# Patient Record
Sex: FEMALE | Race: WHITE | NOT HISPANIC OR LATINO | Employment: FULL TIME | URBAN - NONMETROPOLITAN AREA
[De-identification: names, ages, dates, MRNs, and addresses within clinical notes are randomized per-mention and may not be internally consistent; named-entity substitution may affect disease eponyms.]

---

## 2019-04-18 ENCOUNTER — OFFICE VISIT (OUTPATIENT)
Dept: FAMILY MEDICINE CLINIC | Facility: CLINIC | Age: 24
End: 2019-04-18

## 2019-04-18 VITALS
DIASTOLIC BLOOD PRESSURE: 78 MMHG | OXYGEN SATURATION: 99 % | TEMPERATURE: 98.9 F | SYSTOLIC BLOOD PRESSURE: 124 MMHG | HEIGHT: 71 IN | BODY MASS INDEX: 24.92 KG/M2 | WEIGHT: 178 LBS | HEART RATE: 103 BPM

## 2019-04-18 DIAGNOSIS — Z30.09 FAMILY PLANNING: ICD-10-CM

## 2019-04-18 DIAGNOSIS — K21.9 GASTROESOPHAGEAL REFLUX DISEASE WITHOUT ESOPHAGITIS: ICD-10-CM

## 2019-04-18 DIAGNOSIS — Z00.00 PHYSICAL EXAM: ICD-10-CM

## 2019-04-18 DIAGNOSIS — Z76.89 ENCOUNTER TO ESTABLISH CARE: ICD-10-CM

## 2019-04-18 DIAGNOSIS — I73.00 RAYNAUD'S PHENOMENON WITHOUT GANGRENE: Primary | ICD-10-CM

## 2019-04-18 LAB
ALBUMIN SERPL-MCNC: 4 G/DL (ref 3.5–5.2)
ALBUMIN/GLOB SERPL: 1.1 G/DL
ALP SERPL-CCNC: 57 U/L (ref 39–117)
ALT SERPL W P-5'-P-CCNC: 15 U/L (ref 1–33)
ANION GAP SERPL CALCULATED.3IONS-SCNC: 13.2 MMOL/L
AST SERPL-CCNC: 15 U/L (ref 1–32)
BASOPHILS # BLD AUTO: 0.06 10*3/MM3 (ref 0–0.2)
BASOPHILS NFR BLD AUTO: 0.5 % (ref 0–1.5)
BILIRUB SERPL-MCNC: <0.2 MG/DL (ref 0.2–1.2)
BUN BLD-MCNC: 8 MG/DL (ref 6–20)
BUN/CREAT SERPL: 8.9 (ref 7–25)
CALCIUM SPEC-SCNC: 10 MG/DL (ref 8.6–10.5)
CHLORIDE SERPL-SCNC: 103 MMOL/L (ref 98–107)
CO2 SERPL-SCNC: 24.8 MMOL/L (ref 22–29)
CREAT BLD-MCNC: 0.9 MG/DL (ref 0.57–1)
DEPRECATED RDW RBC AUTO: 40.8 FL (ref 37–54)
EOSINOPHIL # BLD AUTO: 0.81 10*3/MM3 (ref 0–0.4)
EOSINOPHIL NFR BLD AUTO: 6.9 % (ref 0.3–6.2)
ERYTHROCYTE [DISTWIDTH] IN BLOOD BY AUTOMATED COUNT: 12.7 % (ref 12.3–15.4)
GFR SERPL CREATININE-BSD FRML MDRD: 78 ML/MIN/1.73
GLOBULIN UR ELPH-MCNC: 3.7 GM/DL
GLUCOSE BLD-MCNC: 99 MG/DL (ref 65–99)
HCT VFR BLD AUTO: 41.2 % (ref 34–46.6)
HGB BLD-MCNC: 13.6 G/DL (ref 12–15.9)
IMM GRANULOCYTES # BLD AUTO: 0.03 10*3/MM3 (ref 0–0.05)
IMM GRANULOCYTES NFR BLD AUTO: 0.3 % (ref 0–0.5)
LYMPHOCYTES # BLD AUTO: 3.09 10*3/MM3 (ref 0.7–3.1)
LYMPHOCYTES NFR BLD AUTO: 26.4 % (ref 19.6–45.3)
MCH RBC QN AUTO: 29.1 PG (ref 26.6–33)
MCHC RBC AUTO-ENTMCNC: 33 G/DL (ref 31.5–35.7)
MCV RBC AUTO: 88 FL (ref 79–97)
MONOCYTES # BLD AUTO: 0.75 10*3/MM3 (ref 0.1–0.9)
MONOCYTES NFR BLD AUTO: 6.4 % (ref 5–12)
NEUTROPHILS # BLD AUTO: 6.98 10*3/MM3 (ref 1.4–7)
NEUTROPHILS NFR BLD AUTO: 59.5 % (ref 42.7–76)
NRBC BLD AUTO-RTO: 0 /100 WBC (ref 0–0)
PLATELET # BLD AUTO: 269 10*3/MM3 (ref 140–450)
PMV BLD AUTO: 13.7 FL (ref 6–12)
POTASSIUM BLD-SCNC: 4.3 MMOL/L (ref 3.5–5.2)
PROT SERPL-MCNC: 7.7 G/DL (ref 6–8.5)
RBC # BLD AUTO: 4.68 10*6/MM3 (ref 3.77–5.28)
SODIUM BLD-SCNC: 141 MMOL/L (ref 136–145)
WBC NRBC COR # BLD: 11.72 10*3/MM3 (ref 3.4–10.8)

## 2019-04-18 PROCEDURE — 80053 COMPREHEN METABOLIC PANEL: CPT | Performed by: FAMILY MEDICINE

## 2019-04-18 PROCEDURE — 99385 PREV VISIT NEW AGE 18-39: CPT | Performed by: FAMILY MEDICINE

## 2019-04-18 PROCEDURE — 85025 COMPLETE CBC W/AUTO DIFF WBC: CPT | Performed by: FAMILY MEDICINE

## 2019-04-18 RX ORDER — AMLODIPINE BESYLATE 5 MG/1
2.5 TABLET ORAL DAILY
Qty: 90 TABLET | Refills: 1 | Status: SHIPPED | OUTPATIENT
Start: 2019-04-18 | End: 2019-06-05 | Stop reason: SDUPTHER

## 2019-04-18 RX ORDER — NORETHINDRONE ACETATE AND ETHINYL ESTRADIOL 1; .02 MG/1; MG/1
TABLET ORAL
COMMUNITY
End: 2019-04-18 | Stop reason: SINTOL

## 2019-04-18 RX ORDER — AMLODIPINE BESYLATE 5 MG/1
2.5 TABLET ORAL DAILY
COMMUNITY
End: 2019-04-18 | Stop reason: SDUPTHER

## 2019-04-18 RX ORDER — OMEPRAZOLE 20 MG/1
20 CAPSULE, DELAYED RELEASE ORAL DAILY
COMMUNITY
End: 2019-05-17

## 2019-04-18 NOTE — PROGRESS NOTES
Subjective   Sergo Solis is a 23 y.o. female.   Pt presents today with CC of Establish [facility] [place] and Headache      History of Present Illness   1.  Patient is here to [facility].  #2 she has a past medical history of migraine with aura.  She takes oral contraceptive pill containing estrogen.  She is always tolerated this well.  Knowing the risk involved with migraine with aura, she would like to discuss other forms of contraception.  #3 she complains of acute worsening of her headache frequency.  She would like to discuss nonpharmacologic options for migraine treatment.  Her migraines last 12 hours typically, are usually behind one eye or the other, severity 7 out of 10 and associated with phono phobia and photophobia.  Her migraines are not debilitating though make it difficult to work.  She denies any other associated symptoms.  She is having 3/week currently.  She reports associated neck stiffness.  #4 she has a history of raynaud's phenomenon.  She was started on Norvasc, this is helped some..  Her symptoms are worse in the wintertime.  She typically will take 2.5 mg daily in the summer, 5 mg daily in the winter.  #5 she has GERD.  She takes omeprazole 20 mg as needed.  When her symptoms flareup she will take it for a few days then stop.  She reports that she takes this medicine relatively rarely.         The following portions of the patient's history were reviewed and updated as appropriate: allergies, current medications, past family history, past social history, past surgical history and problem list.    Review of Systems   Constitutional: Negative for chills, fever and unexpected weight loss.   HENT: Negative for congestion and sore throat.    Eyes: Negative for blurred vision and visual disturbance.   Respiratory: Negative for cough and wheezing.    Cardiovascular: Negative for chest pain and palpitations.   Gastrointestinal: Negative for abdominal pain and diarrhea.   Genitourinary: Negative for  dysuria.   Musculoskeletal: Negative for arthralgias and neck stiffness.   Neurological: Negative for dizziness, seizures and syncope.   Psychiatric/Behavioral: Negative for self-injury, suicidal ideas and depressed mood.       Objective   Physical Exam   Constitutional: She is oriented to person, place, and time. She appears well-developed and well-nourished.   HENT:   Head: Normocephalic and atraumatic.   Right Ear: External ear normal.   Left Ear: External ear normal.   Nose: Nose normal.   Mouth/Throat: Oropharynx is clear and moist.   Eyes: Conjunctivae and EOM are normal. Pupils are equal, round, and reactive to light.   Neck: Normal range of motion. Neck supple.   Cardiovascular: Normal rate, regular rhythm and normal heart sounds.   Pulmonary/Chest: Effort normal and breath sounds normal.   Abdominal: Soft. Bowel sounds are normal.   Musculoskeletal:   Fingertips cool to touch, otherwise neurovascularly intact.   Neurological: She is alert and oriented to person, place, and time.   Skin: Skin is warm and dry.   Psychiatric: She has a normal mood and affect. Her behavior is normal.         Assessment/Plan   Sergo was seen today for establish care and headache.    Diagnoses and all orders for this visit:    Raynaud's phenomenon without gangrene  -     CBC & Differential  -     Comprehensive Metabolic Panel  -     amLODIPine (NORVASC) 5 MG tablet; Take 0.5 tablets by mouth Daily.  -     CBC Auto Differential  -     Manual Differential; Future  -     Cancel: Manual Differential  As she has no concerns with it and reports that it works, we will continue it at her current dose.  Side effects were reviewed and she was agreeable to take it as prescribed.  Encounter to establish care  -     CBC & Differential  -     Comprehensive Metabolic Panel  -     CBC Auto Differential  -     Manual Differential; Future  -     Cancel: Manual Differential    Gastroesophageal reflux disease without esophagitis  -     CBC &  Differential  -     Comprehensive Metabolic Panel  -     CBC Auto Differential  -     Manual Differential; Future  -     Cancel: Manual Differential    Physical exam  -     CBC & Differential  -     Comprehensive Metabolic Panel  -     CBC Auto Differential  -     Manual Differential; Future  -     Cancel: Manual Differential  No concerns on exam today.  No gynecologic exam done as she has this done elsewhere.  Age-specific counseling given including discussions of nutrition, family planning/contraception, physical activity, healthy weight, injury prevention,  mental health, immunizations, and disease screening.  Patient has no concerns.  Family-planning  I recommended she stop the estrogen containing oral contraceptive at the end of her next pack.  We discussed options and she would like Nexplanon.  Nexplanon ordered, she understands that it may take a few weeks for her insurance to approve it in for her to arrive to the clinic.  She is agreeable.  She is to use condoms for pregnancy prevention until further notice.

## 2019-04-19 ENCOUNTER — TELEPHONE (OUTPATIENT)
Dept: FAMILY MEDICINE CLINIC | Facility: CLINIC | Age: 24
End: 2019-04-19

## 2019-04-19 NOTE — TELEPHONE ENCOUNTER
----- Message from Vijay Rodríguez DO sent at 4/19/2019  8:38 AM EDT -----  Please send letter.  With the exception of a white blood cell count that was slightly elevated (11.72), there were no concerns on labs.  Please let us know if you have any questions.      Letter mailed.

## 2019-04-21 PROBLEM — Z30.09 FAMILY PLANNING: Status: ACTIVE | Noted: 2019-04-21

## 2019-05-08 ENCOUNTER — TELEPHONE (OUTPATIENT)
Dept: FAMILY MEDICINE CLINIC | Facility: CLINIC | Age: 24
End: 2019-05-08

## 2019-05-17 ENCOUNTER — OFFICE VISIT (OUTPATIENT)
Dept: FAMILY MEDICINE CLINIC | Facility: CLINIC | Age: 24
End: 2019-05-17

## 2019-05-17 VITALS
DIASTOLIC BLOOD PRESSURE: 74 MMHG | HEIGHT: 71 IN | WEIGHT: 177.8 LBS | BODY MASS INDEX: 24.89 KG/M2 | SYSTOLIC BLOOD PRESSURE: 118 MMHG | TEMPERATURE: 99 F | OXYGEN SATURATION: 100 % | HEART RATE: 83 BPM

## 2019-05-17 DIAGNOSIS — Z30.09 FAMILY PLANNING: Primary | ICD-10-CM

## 2019-05-17 LAB
B-HCG UR QL: NEGATIVE
INTERNAL NEGATIVE CONTROL: NEGATIVE
INTERNAL POSITIVE CONTROL: POSITIVE
Lab: NORMAL

## 2019-05-17 PROCEDURE — 81025 URINE PREGNANCY TEST: CPT | Performed by: FAMILY MEDICINE

## 2019-05-17 PROCEDURE — 11981 INSERTION DRUG DLVR IMPLANT: CPT | Performed by: FAMILY MEDICINE

## 2019-05-17 NOTE — PROGRESS NOTES
Subjective   Sergo Solis is a 24 y.o. female.   Pt presents today with CC of Procedure      History of Present Illness   Patient is here to follow-up for Nexplanon placement.  She denies fevers or chills.  She is currently on her menses.  She is right-handed, she would like the implant in her left arm         The following portions of the patient's history were reviewed and updated as appropriate: allergies, current medications, past family history, past social history, past surgical history and problem list.    Review of Systems   Constitutional: Negative for chills, fatigue and fever.   HENT: Negative for congestion, postnasal drip and rhinorrhea.    Eyes: Negative for blurred vision, double vision and itching.   Respiratory: Negative for cough and shortness of breath.    Cardiovascular: Negative for chest pain.   Musculoskeletal: Negative for back pain, joint swelling and myalgias.   Neurological: Negative for dizziness and light-headedness.       Objective   Physical Exam   Constitutional: She is oriented to person, place, and time. She appears well-developed and well-nourished.   HENT:   Head: Normocephalic and atraumatic.   Mouth/Throat: Oropharynx is clear and moist.   Eyes: Conjunctivae are normal. No scleral icterus.   Neck: No thyromegaly present.   Cardiovascular: Normal rate and regular rhythm.   Pulmonary/Chest: Effort normal and breath sounds normal.   Abdominal: Soft.   Lymphadenopathy:     She has no cervical adenopathy.   Neurological: She is alert and oriented to person, place, and time.   Nursing note and vitals reviewed.    Procedures     Nexplanon placement: Urine hCG was negative.  Patient prefers her left arm.  Patient laid supine with her left arm flexed at the elbow and externally rotated.  The insertion site was marked at 9 cm from her medial epicondyle, the site was cleaned with alcohol and iodine and allowed to dry.  Insertion site and track for the Nexplanon insertion was  anesthetized with 1% lidocaine.  Nexplanon insertion device placed at 30 degrees from her skin and inserted, it was tracked underneath her skin to full insertion, the device was deployed.  The insertion site was sealed and bandaged.  Was wrapped with compressive bandage to avoid bruising.  She was instructed to leave the bandage on for 2 days.  No baths, she may shower if she avoids getting it wet.  She was instructed on signs of infection if she has any problems she will return to clinic.  She tolerated the procedure well without any complications before leaving.  Device expiration date October 11, 2021, lot number [id number].  Assessment/Plan   Sergo was seen today for procedure.    Diagnoses and all orders for this visit:    Family planning  -     POCT pregnancy, urine                 Patient's Body mass index is 24.81 kg/m². BMI is above normal parameters. Recommendations include: nutrition counseling.

## 2019-05-20 PROBLEM — Z76.89 ENCOUNTER TO ESTABLISH CARE: Status: RESOLVED | Noted: 2019-04-18 | Resolved: 2019-05-20

## 2019-06-05 DIAGNOSIS — I73.00 RAYNAUD'S PHENOMENON WITHOUT GANGRENE: ICD-10-CM

## 2019-06-05 RX ORDER — AMLODIPINE BESYLATE 2.5 MG/1
2.5 TABLET ORAL DAILY
Qty: 90 TABLET | Refills: 0 | Status: SHIPPED | OUTPATIENT
Start: 2019-06-05 | End: 2019-09-20 | Stop reason: SDUPTHER

## 2019-09-11 ENCOUNTER — TELEPHONE (OUTPATIENT)
Dept: FAMILY MEDICINE CLINIC | Facility: CLINIC | Age: 24
End: 2019-09-11

## 2019-09-11 NOTE — TELEPHONE ENCOUNTER
"Pt called and stated she pulled off at least \"100\" deer tick off of her. Pt wants to know if she needs antibiotic as precaution?   "

## 2019-09-20 ENCOUNTER — OFFICE VISIT (OUTPATIENT)
Dept: FAMILY MEDICINE CLINIC | Facility: CLINIC | Age: 24
End: 2019-09-20

## 2019-09-20 VITALS
HEART RATE: 97 BPM | WEIGHT: 177 LBS | SYSTOLIC BLOOD PRESSURE: 120 MMHG | DIASTOLIC BLOOD PRESSURE: 76 MMHG | BODY MASS INDEX: 24.78 KG/M2 | HEIGHT: 71 IN | OXYGEN SATURATION: 100 % | TEMPERATURE: 99.3 F

## 2019-09-20 DIAGNOSIS — I73.00 RAYNAUD'S PHENOMENON WITHOUT GANGRENE: ICD-10-CM

## 2019-09-20 DIAGNOSIS — R21 RASH: Primary | ICD-10-CM

## 2019-09-20 PROBLEM — Z00.00 PHYSICAL EXAM: Status: RESOLVED | Noted: 2019-04-18 | Resolved: 2019-09-20

## 2019-09-20 PROCEDURE — 96372 THER/PROPH/DIAG INJ SC/IM: CPT | Performed by: FAMILY MEDICINE

## 2019-09-20 PROCEDURE — 99213 OFFICE O/P EST LOW 20 MIN: CPT | Performed by: FAMILY MEDICINE

## 2019-09-20 RX ORDER — AMLODIPINE BESYLATE 2.5 MG/1
2.5 TABLET ORAL DAILY
Qty: 90 TABLET | Refills: 0 | Status: SHIPPED | OUTPATIENT
Start: 2019-09-20 | End: 2019-12-22 | Stop reason: SDUPTHER

## 2019-09-20 RX ORDER — CLOBETASOL PROPIONATE 0.5 MG/G
OINTMENT TOPICAL 2 TIMES DAILY
Qty: 30 G | Refills: 0 | Status: ON HOLD | OUTPATIENT
Start: 2019-09-20 | End: 2022-09-19

## 2019-09-20 RX ORDER — METHYLPREDNISOLONE ACETATE 40 MG/ML
40 INJECTION, SUSPENSION INTRA-ARTICULAR; INTRALESIONAL; INTRAMUSCULAR; SOFT TISSUE ONCE
Status: COMPLETED | OUTPATIENT
Start: 2019-09-20 | End: 2019-09-20

## 2019-09-20 RX ADMIN — METHYLPREDNISOLONE ACETATE 40 MG: 40 INJECTION, SUSPENSION INTRA-ARTICULAR; INTRALESIONAL; INTRAMUSCULAR; SOFT TISSUE at 11:28

## 2019-09-20 NOTE — PROGRESS NOTES
"Catina Solis is a 24 y.o. female.   Pt presents today with CC of Rash      History of Present Illness   Patient is here complaining of rash on her lower legs.  She reports that she was in the woods recently, she states that \"100s\" of tiny little bugs on her legs.  She had gotten them all off by that evening.  No tics were latched on for greater than 12 hours that she knows of.  She denies erythema migrans-like rash.  She denies any other systemic symptoms.  The rash is papular, somewhat itchy, and localized to her lower legs.  She has tried putting nail polish on them without any benefit.    She would like a refill of amlodipine.  She takes this for raynauds phenomenon.  She has not had a problem since starting this medication.         The following portions of the patient's history were reviewed and updated as appropriate: allergies, current medications, past family history, past medical history, past surgical history and problem list.    Review of Systems   Constitutional: Negative for chills, fever and unexpected weight loss.   HENT: Negative for congestion and sore throat.    Eyes: Negative for blurred vision and visual disturbance.   Respiratory: Negative for cough and wheezing.    Cardiovascular: Negative for chest pain and palpitations.   Gastrointestinal: Negative for abdominal pain and diarrhea.   Endocrine: Negative for cold intolerance and heat intolerance.   Genitourinary: Negative for dysuria.   Musculoskeletal: Negative for arthralgias and neck stiffness.   Neurological: Negative for dizziness, seizures and syncope.   Psychiatric/Behavioral: Negative for self-injury, suicidal ideas and depressed mood.       Objective   Physical Exam   Constitutional: She is oriented to person, place, and time. She appears well-developed and well-nourished.   HENT:   Head: Normocephalic and atraumatic.   Eyes: Conjunctivae are normal.   Neck: Normal range of motion. Neck supple.   Cardiovascular: " Normal rate, regular rhythm and normal heart sounds.   Pulmonary/Chest: Effort normal and breath sounds normal.   Abdominal: Soft. Bowel sounds are normal.   Lymphadenopathy:     She has no cervical adenopathy.   Neurological: She is alert and oriented to person, place, and time.   Skin: Skin is warm and dry.   Papular rash on her lower extremities, no infection noted.  Appears to be healing well.  Consistent with arthropod bites.   Nursing note and vitals reviewed.        Assessment/Plan   Sergo was seen today for rash.    Diagnoses and all orders for this visit:    Rash  -     clobetasol (TEMOVATE) 0.05 % ointment; Apply  topically to the appropriate area as directed 2 (Two) Times a Day.  -     methylPREDNISolone acetate (DEPO-medrol) injection 40 mg  Depo-Medrol 40 mg given today.  Recommended that she mix clobetasol one part with 4 parts lotion and rub on the papules to help relieve itch.  If she has signs of infection, recommend she follow-up.  She is agreeable to plan.  Screening for tickborne illnesses none indicated in my medical opinion.  Raynaud's phenomenon without gangrene  -     amLODIPine (NORVASC) 2.5 MG tablet; Take 1 tablet by mouth Daily.               Patient's Body mass index is 24.7 kg/m². BMI is above normal parameters. Recommendations include: exercise counseling and nutrition counseling.

## 2019-10-01 DIAGNOSIS — R21 RASH: Primary | ICD-10-CM

## 2019-12-22 DIAGNOSIS — I73.00 RAYNAUD'S PHENOMENON WITHOUT GANGRENE: ICD-10-CM

## 2019-12-22 DIAGNOSIS — R21 RASH: ICD-10-CM

## 2019-12-23 RX ORDER — AMLODIPINE BESYLATE 2.5 MG/1
2.5 TABLET ORAL DAILY
Qty: 90 TABLET | Refills: 0 | Status: ON HOLD | OUTPATIENT
Start: 2019-12-23 | End: 2022-09-19

## 2019-12-23 RX ORDER — AMLODIPINE BESYLATE 2.5 MG/1
2.5 TABLET ORAL DAILY
Qty: 90 TABLET | Refills: 0 | Status: SHIPPED | OUTPATIENT
Start: 2019-12-23 | End: 2020-03-15 | Stop reason: SDUPTHER

## 2020-01-27 DIAGNOSIS — N64.3 GALACTORRHEA IN FEMALE: Primary | ICD-10-CM

## 2020-01-28 ENCOUNTER — LAB (OUTPATIENT)
Dept: FAMILY MEDICINE CLINIC | Facility: CLINIC | Age: 25
End: 2020-01-28

## 2020-01-28 DIAGNOSIS — N64.3 GALACTORRHEA IN FEMALE: ICD-10-CM

## 2020-01-28 PROCEDURE — 84702 CHORIONIC GONADOTROPIN TEST: CPT | Performed by: FAMILY MEDICINE

## 2020-01-28 PROCEDURE — 84443 ASSAY THYROID STIM HORMONE: CPT | Performed by: FAMILY MEDICINE

## 2020-01-28 PROCEDURE — 84146 ASSAY OF PROLACTIN: CPT | Performed by: FAMILY MEDICINE

## 2020-01-28 PROCEDURE — 80053 COMPREHEN METABOLIC PANEL: CPT | Performed by: FAMILY MEDICINE

## 2020-01-29 LAB
ALBUMIN SERPL-MCNC: 5 G/DL (ref 3.5–5.2)
ALBUMIN/GLOB SERPL: 1.4 G/DL
ALP SERPL-CCNC: 100 U/L (ref 39–117)
ALT SERPL W P-5'-P-CCNC: 80 U/L (ref 1–33)
ANION GAP SERPL CALCULATED.3IONS-SCNC: 19.9 MMOL/L (ref 5–15)
AST SERPL-CCNC: 44 U/L (ref 1–32)
BILIRUB SERPL-MCNC: 0.2 MG/DL (ref 0.2–1.2)
BUN BLD-MCNC: 12 MG/DL (ref 6–20)
BUN/CREAT SERPL: 14.3 (ref 7–25)
CALCIUM SPEC-SCNC: 10.1 MG/DL (ref 8.6–10.5)
CHLORIDE SERPL-SCNC: 104 MMOL/L (ref 98–107)
CO2 SERPL-SCNC: 25.1 MMOL/L (ref 22–29)
CREAT BLD-MCNC: 0.84 MG/DL (ref 0.57–1)
GFR SERPL CREATININE-BSD FRML MDRD: 84 ML/MIN/1.73
GLOBULIN UR ELPH-MCNC: 3.5 GM/DL
GLUCOSE BLD-MCNC: 87 MG/DL (ref 65–99)
HCG INTACT+B SERPL-ACNC: <0.5 MIU/ML
POTASSIUM BLD-SCNC: 4.5 MMOL/L (ref 3.5–5.2)
PROLACTIN SERPL-MCNC: 23.5 NG/ML (ref 4.79–23.3)
PROT SERPL-MCNC: 8.5 G/DL (ref 6–8.5)
SODIUM BLD-SCNC: 149 MMOL/L (ref 136–145)
TSH SERPL DL<=0.05 MIU/L-ACNC: 2.53 UIU/ML (ref 0.27–4.2)

## 2020-01-30 ENCOUNTER — OFFICE VISIT (OUTPATIENT)
Dept: FAMILY MEDICINE CLINIC | Facility: CLINIC | Age: 25
End: 2020-01-30

## 2020-01-30 VITALS
HEIGHT: 71 IN | TEMPERATURE: 99.1 F | WEIGHT: 184.2 LBS | OXYGEN SATURATION: 98 % | HEART RATE: 83 BPM | BODY MASS INDEX: 25.79 KG/M2 | DIASTOLIC BLOOD PRESSURE: 78 MMHG | SYSTOLIC BLOOD PRESSURE: 126 MMHG

## 2020-01-30 DIAGNOSIS — Z30.09 FAMILY PLANNING: ICD-10-CM

## 2020-01-30 DIAGNOSIS — Z30.46 NEXPLANON REMOVAL: Primary | ICD-10-CM

## 2020-01-30 PROCEDURE — 11982 REMOVE DRUG IMPLANT DEVICE: CPT | Performed by: FAMILY MEDICINE

## 2020-01-30 PROCEDURE — 99213 OFFICE O/P EST LOW 20 MIN: CPT | Performed by: FAMILY MEDICINE

## 2020-03-15 DIAGNOSIS — I73.00 RAYNAUD'S PHENOMENON WITHOUT GANGRENE: ICD-10-CM

## 2020-03-16 RX ORDER — AMLODIPINE BESYLATE 2.5 MG/1
2.5 TABLET ORAL DAILY
Qty: 90 TABLET | Refills: 1 | Status: ON HOLD | OUTPATIENT
Start: 2020-03-16 | End: 2022-09-19

## 2020-04-10 ENCOUNTER — LAB (OUTPATIENT)
Dept: LAB | Facility: HOSPITAL | Age: 25
End: 2020-04-10

## 2020-04-10 DIAGNOSIS — Z20.828 EXPOSURE TO SARS-ASSOCIATED CORONAVIRUS: ICD-10-CM

## 2020-04-10 DIAGNOSIS — Z20.828 EXPOSURE TO SARS-ASSOCIATED CORONAVIRUS: Primary | ICD-10-CM

## 2020-04-10 PROCEDURE — U0003 INFECTIOUS AGENT DETECTION BY NUCLEIC ACID (DNA OR RNA); SEVERE ACUTE RESPIRATORY SYNDROME CORONAVIRUS 2 (SARS-COV-2) (CORONAVIRUS DISEASE [COVID-19]), AMPLIFIED PROBE TECHNIQUE, MAKING USE OF HIGH THROUGHPUT TECHNOLOGIES AS DESCRIBED BY CMS-2020-01-R: HCPCS

## 2020-04-10 PROCEDURE — 87635 SARS-COV-2 COVID-19 AMP PRB: CPT

## 2020-04-11 LAB — SARS-COV-2 RNA RESP QL NAA+PROBE: NOT DETECTED

## 2020-05-11 DIAGNOSIS — R74.8 ELEVATED LIVER ENZYMES: Primary | ICD-10-CM

## 2020-05-29 ENCOUNTER — LAB (OUTPATIENT)
Dept: FAMILY MEDICINE CLINIC | Facility: CLINIC | Age: 25
End: 2020-05-29

## 2020-05-29 DIAGNOSIS — R74.8 ELEVATED LIVER ENZYMES: Primary | ICD-10-CM

## 2020-05-29 DIAGNOSIS — I73.00 RAYNAUD'S PHENOMENON WITHOUT GANGRENE: ICD-10-CM

## 2020-05-29 DIAGNOSIS — R21 RASH: ICD-10-CM

## 2020-05-29 DIAGNOSIS — R74.8 ELEVATED LIVER ENZYMES: ICD-10-CM

## 2020-05-29 DIAGNOSIS — Z30.46 NEXPLANON REMOVAL: ICD-10-CM

## 2020-05-29 PROCEDURE — 80053 COMPREHEN METABOLIC PANEL: CPT | Performed by: FAMILY MEDICINE

## 2020-05-29 PROCEDURE — 86769 SARS-COV-2 COVID-19 ANTIBODY: CPT | Performed by: FAMILY MEDICINE

## 2020-05-30 LAB
ALBUMIN SERPL-MCNC: 4.8 G/DL (ref 3.5–5.2)
ALBUMIN/GLOB SERPL: 1.5 G/DL
ALP SERPL-CCNC: 89 U/L (ref 39–117)
ALT SERPL W P-5'-P-CCNC: 117 U/L (ref 1–33)
ANION GAP SERPL CALCULATED.3IONS-SCNC: 11.2 MMOL/L (ref 5–15)
AST SERPL-CCNC: 65 U/L (ref 1–32)
BILIRUB SERPL-MCNC: 0.3 MG/DL (ref 0.2–1.2)
BUN BLD-MCNC: 8 MG/DL (ref 6–20)
BUN/CREAT SERPL: 9.8 (ref 7–25)
CALCIUM SPEC-SCNC: 9.6 MG/DL (ref 8.6–10.5)
CHLORIDE SERPL-SCNC: 100 MMOL/L (ref 98–107)
CO2 SERPL-SCNC: 26.8 MMOL/L (ref 22–29)
CREAT BLD-MCNC: 0.82 MG/DL (ref 0.57–1)
GFR SERPL CREATININE-BSD FRML MDRD: 86 ML/MIN/1.73
GLOBULIN UR ELPH-MCNC: 3.2 GM/DL
GLUCOSE BLD-MCNC: 87 MG/DL (ref 65–99)
POTASSIUM BLD-SCNC: 3.8 MMOL/L (ref 3.5–5.2)
PROT SERPL-MCNC: 8 G/DL (ref 6–8.5)
SODIUM BLD-SCNC: 138 MMOL/L (ref 136–145)

## 2020-05-31 LAB — ABBOTT SARS-COV-2 AB, IGG: NEGATIVE

## 2020-06-01 ENCOUNTER — TELEPHONE (OUTPATIENT)
Dept: FAMILY MEDICINE CLINIC | Facility: CLINIC | Age: 25
End: 2020-06-01

## 2020-06-01 DIAGNOSIS — R74.8 ELEVATED LIVER ENZYMES: Primary | ICD-10-CM

## 2020-06-01 LAB — SARS-COV-2 IGM SERPL QL IA: NEGATIVE

## 2020-06-01 NOTE — TELEPHONE ENCOUNTER
Patient notified and expressed understanding.  She cancelled this weeks appointment and will reschedule once labs have resulted.

## 2020-06-01 NOTE — TELEPHONE ENCOUNTER
Patient notified and expressed understanding. She has an in office appointment later this week.         ----- Message from Vijay Rodríguez,  sent at 6/1/2020  8:10 AM EDT -----  I reviewed your labs.  Your ALT and AST both went up a little more from 4 months ago.  ALT is approximately 3-1/2 times the upper limit of normal, AST is twice the upper limit of normal.  Please make a follow-up appointment to discuss what may be causing this.

## 2020-06-01 NOTE — TELEPHONE ENCOUNTER
Patient called and I scheduled an appointment for  later this week to review recent labs.  In the meantime, she has called back asking if she can get additional labs prior to her appointment as she is worried.  Please advise.

## 2020-06-02 ENCOUNTER — LAB (OUTPATIENT)
Dept: FAMILY MEDICINE CLINIC | Facility: CLINIC | Age: 25
End: 2020-06-02

## 2020-06-02 DIAGNOSIS — R74.8 ELEVATED LIVER ENZYMES: ICD-10-CM

## 2020-06-02 LAB — SARS-COV-2 IGA SERPL QL IA: NEGATIVE

## 2020-06-02 PROCEDURE — 80061 LIPID PANEL: CPT | Performed by: FAMILY MEDICINE

## 2020-06-02 PROCEDURE — 82977 ASSAY OF GGT: CPT | Performed by: FAMILY MEDICINE

## 2020-06-02 PROCEDURE — 86803 HEPATITIS C AB TEST: CPT | Performed by: FAMILY MEDICINE

## 2020-06-02 PROCEDURE — 85025 COMPLETE CBC W/AUTO DIFF WBC: CPT | Performed by: FAMILY MEDICINE

## 2020-06-02 PROCEDURE — 80053 COMPREHEN METABOLIC PANEL: CPT | Performed by: FAMILY MEDICINE

## 2020-06-02 PROCEDURE — 82728 ASSAY OF FERRITIN: CPT | Performed by: FAMILY MEDICINE

## 2020-06-02 PROCEDURE — 86038 ANTINUCLEAR ANTIBODIES: CPT | Performed by: FAMILY MEDICINE

## 2020-06-02 PROCEDURE — 82390 ASSAY OF CERULOPLASMIN: CPT | Performed by: FAMILY MEDICINE

## 2020-06-02 PROCEDURE — 83516 IMMUNOASSAY NONANTIBODY: CPT | Performed by: FAMILY MEDICINE

## 2020-06-02 PROCEDURE — 86140 C-REACTIVE PROTEIN: CPT | Performed by: FAMILY MEDICINE

## 2020-06-03 ENCOUNTER — TELEPHONE (OUTPATIENT)
Dept: FAMILY MEDICINE CLINIC | Facility: CLINIC | Age: 25
End: 2020-06-03

## 2020-06-03 LAB
ACTIN IGG SERPL-ACNC: 8 UNITS (ref 0–19)
ALBUMIN SERPL-MCNC: 4.9 G/DL (ref 3.5–5.2)
ALBUMIN/GLOB SERPL: 1.6 G/DL
ALP SERPL-CCNC: 87 U/L (ref 39–117)
ALT SERPL W P-5'-P-CCNC: 124 U/L (ref 1–33)
ANA SER QL: NEGATIVE
ANION GAP SERPL CALCULATED.3IONS-SCNC: 12 MMOL/L (ref 5–15)
AST SERPL-CCNC: 71 U/L (ref 1–32)
BASOPHILS # BLD AUTO: 0.06 10*3/MM3 (ref 0–0.2)
BASOPHILS NFR BLD AUTO: 0.7 % (ref 0–1.5)
BILIRUB SERPL-MCNC: 0.4 MG/DL (ref 0.2–1.2)
BUN BLD-MCNC: 8 MG/DL (ref 6–20)
BUN/CREAT SERPL: 11.4 (ref 7–25)
CALCIUM SPEC-SCNC: 9.7 MG/DL (ref 8.6–10.5)
CERULOPLASMIN SERPL-MCNC: 24 MG/DL (ref 19–39)
CHLORIDE SERPL-SCNC: 101 MMOL/L (ref 98–107)
CHOLEST SERPL-MCNC: 177 MG/DL (ref 0–200)
CO2 SERPL-SCNC: 25 MMOL/L (ref 22–29)
CREAT BLD-MCNC: 0.7 MG/DL (ref 0.57–1)
CRP SERPL-MCNC: 0.22 MG/DL (ref 0–0.5)
DEPRECATED RDW RBC AUTO: 40.9 FL (ref 37–54)
EOSINOPHIL # BLD AUTO: 0.65 10*3/MM3 (ref 0–0.4)
EOSINOPHIL NFR BLD AUTO: 7.9 % (ref 0.3–6.2)
ERYTHROCYTE [DISTWIDTH] IN BLOOD BY AUTOMATED COUNT: 13.1 % (ref 12.3–15.4)
FERRITIN SERPL-MCNC: 34.9 NG/ML (ref 13–150)
GFR SERPL CREATININE-BSD FRML MDRD: 103 ML/MIN/1.73
GGT SERPL-CCNC: 20 U/L (ref 5–36)
GLOBULIN UR ELPH-MCNC: 3.1 GM/DL
GLUCOSE BLD-MCNC: 81 MG/DL (ref 65–99)
HCT VFR BLD AUTO: 39.7 % (ref 34–46.6)
HCV AB SER DONR QL: NORMAL
HDLC SERPL-MCNC: 44 MG/DL (ref 40–60)
HGB BLD-MCNC: 13.4 G/DL (ref 12–15.9)
IMM GRANULOCYTES # BLD AUTO: 0.02 10*3/MM3 (ref 0–0.05)
IMM GRANULOCYTES NFR BLD AUTO: 0.2 % (ref 0–0.5)
LDLC SERPL CALC-MCNC: 118 MG/DL (ref 0–100)
LDLC/HDLC SERPL: 2.68 {RATIO}
LYMPHOCYTES # BLD AUTO: 2.34 10*3/MM3 (ref 0.7–3.1)
LYMPHOCYTES NFR BLD AUTO: 28.3 % (ref 19.6–45.3)
MCH RBC QN AUTO: 28.9 PG (ref 26.6–33)
MCHC RBC AUTO-ENTMCNC: 33.8 G/DL (ref 31.5–35.7)
MCV RBC AUTO: 85.7 FL (ref 79–97)
MONOCYTES # BLD AUTO: 0.57 10*3/MM3 (ref 0.1–0.9)
MONOCYTES NFR BLD AUTO: 6.9 % (ref 5–12)
NEUTROPHILS # BLD AUTO: 4.62 10*3/MM3 (ref 1.7–7)
NEUTROPHILS NFR BLD AUTO: 56 % (ref 42.7–76)
NRBC BLD AUTO-RTO: 0 /100 WBC (ref 0–0.2)
PLATELET # BLD AUTO: 238 10*3/MM3 (ref 140–450)
PMV BLD AUTO: 13.2 FL (ref 6–12)
POTASSIUM BLD-SCNC: 3.9 MMOL/L (ref 3.5–5.2)
PROT SERPL-MCNC: 8 G/DL (ref 6–8.5)
RBC # BLD AUTO: 4.63 10*6/MM3 (ref 3.77–5.28)
SODIUM BLD-SCNC: 138 MMOL/L (ref 136–145)
TRIGL SERPL-MCNC: 75 MG/DL (ref 0–150)
VLDLC SERPL-MCNC: 15 MG/DL (ref 5–40)
WBC NRBC COR # BLD: 8.26 10*3/MM3 (ref 3.4–10.8)

## 2020-06-03 NOTE — TELEPHONE ENCOUNTER
----- Message from Vijay Rodríguez DO sent at 6/3/2020 10:04 AM EDT -----  All of her COVID-19 testing returned normal.  Her liver testing still has portions pending(in case she asks)        Patient was notified of lab results & verbalized understanding.

## 2020-06-04 ENCOUNTER — TELEPHONE (OUTPATIENT)
Dept: FAMILY MEDICINE CLINIC | Facility: CLINIC | Age: 25
End: 2020-06-04

## 2020-06-04 DIAGNOSIS — R74.8 ELEVATED LIVER ENZYMES: Primary | ICD-10-CM

## 2020-06-04 NOTE — TELEPHONE ENCOUNTER
----- Message from Vijay Rodríguez, DO sent at 6/4/2020 11:30 AM EDT -----  I reviewed your labs.  Everything looks okay, though your liver enzymes have increased a little further.  Your ALT was 124, AST 71.  This is up just a tiny amount from before.  They have slowly increased over the past year.  You take any medications other than amlodipine?  If not, I recommend stopping amlodipine.  I want to get an ultrasound of your liver/gallbladder.        Spoke with patient & she verbalized understanding,she already stopped the Amlodipine & stopped Omeprazole that she took only as needed,she wants to call her insurance & see how much she would be responsible for before scheduling an U/S,will let us know.    Called patient she has not had time to check with her insurance,she will let you know if she decides to have an U/S.

## 2020-06-11 ENCOUNTER — APPOINTMENT (OUTPATIENT)
Dept: ULTRASOUND IMAGING | Facility: HOSPITAL | Age: 25
End: 2020-06-11

## 2020-07-23 ENCOUNTER — HOSPITAL ENCOUNTER (OUTPATIENT)
Dept: ULTRASOUND IMAGING | Facility: HOSPITAL | Age: 25
Discharge: HOME | End: 2020-07-23
Admitting: FAMILY MEDICINE

## 2020-07-23 DIAGNOSIS — R74.8 ELEVATED LIVER ENZYMES: ICD-10-CM

## 2020-07-23 PROCEDURE — 76700 US EXAM ABDOM COMPLETE: CPT

## 2020-07-23 PROCEDURE — 76700 US EXAM ABDOM COMPLETE: CPT | Performed by: RADIOLOGY

## 2020-07-27 ENCOUNTER — TELEPHONE (OUTPATIENT)
Dept: FAMILY MEDICINE CLINIC | Facility: CLINIC | Age: 25
End: 2020-07-27

## 2020-07-27 NOTE — TELEPHONE ENCOUNTER
----- Message from Vijay Rodríguez DO sent at 7/26/2020  4:30 PM EDT -----  Your ultrasound returned.  Nothing seen to account for your symptoms/labs.  Your liver appears normal.  Please follow-up to discuss.      Left a message to return call.    Spoke with patient & she verbalized understanding,will call back when she has a day off for F/U.

## 2020-08-24 ENCOUNTER — TRANSCRIBE ORDERS (OUTPATIENT)
Dept: ADMINISTRATIVE | Facility: HOSPITAL | Age: 25
End: 2020-08-24

## 2020-08-24 DIAGNOSIS — G89.29 CHRONIC NONINTRACTABLE HEADACHE, UNSPECIFIED HEADACHE TYPE: Primary | ICD-10-CM

## 2020-08-24 DIAGNOSIS — R51.9 CHRONIC NONINTRACTABLE HEADACHE, UNSPECIFIED HEADACHE TYPE: Primary | ICD-10-CM

## 2020-08-26 ENCOUNTER — APPOINTMENT (OUTPATIENT)
Dept: MRI IMAGING | Facility: HOSPITAL | Age: 25
End: 2020-08-26

## 2020-09-03 ENCOUNTER — HOSPITAL ENCOUNTER (OUTPATIENT)
Dept: MRI IMAGING | Facility: HOSPITAL | Age: 25
Discharge: HOME | End: 2020-09-03
Admitting: INTERNAL MEDICINE

## 2020-09-03 DIAGNOSIS — R51.9 CHRONIC NONINTRACTABLE HEADACHE, UNSPECIFIED HEADACHE TYPE: ICD-10-CM

## 2020-09-03 DIAGNOSIS — G89.29 CHRONIC NONINTRACTABLE HEADACHE, UNSPECIFIED HEADACHE TYPE: ICD-10-CM

## 2020-09-03 LAB — CREAT BLDA-MCNC: 0.7 MG/DL (ref 0.6–1.3)

## 2020-09-03 PROCEDURE — 25010000002 GADOTERATE MEGLUMINE 10 MMOL/20ML SOLUTION

## 2020-09-03 PROCEDURE — A9575 INJ GADOTERATE MEGLUMI 0.1ML: HCPCS | Performed by: INTERNAL MEDICINE

## 2020-09-03 PROCEDURE — A9575 INJ GADOTERATE MEGLUMI 0.1ML: HCPCS

## 2020-09-03 PROCEDURE — 82565 ASSAY OF CREATININE: CPT

## 2020-09-03 PROCEDURE — 70553 MRI BRAIN STEM W/O & W/DYE: CPT

## 2020-09-03 PROCEDURE — 25010000002 GADOTERATE MEGLUMINE 10 MMOL/20ML SOLUTION: Performed by: INTERNAL MEDICINE

## 2020-09-03 PROCEDURE — 70553 MRI BRAIN STEM W/O & W/DYE: CPT | Performed by: RADIOLOGY

## 2020-09-03 RX ORDER — GADOTERATE MEGLUMINE 376.9 MG/ML
17 INJECTION INTRAVENOUS
Status: COMPLETED | OUTPATIENT
Start: 2020-09-03 | End: 2020-09-03

## 2020-09-03 RX ADMIN — GADOTERATE MEGLUMINE 17 ML: 376.9 INJECTION, SOLUTION INTRAVENOUS at 10:15

## 2021-03-18 ENCOUNTER — BULK ORDERING (OUTPATIENT)
Dept: CASE MANAGEMENT | Facility: OTHER | Age: 26
End: 2021-03-18

## 2021-03-18 DIAGNOSIS — Z23 IMMUNIZATION DUE: ICD-10-CM

## 2022-03-28 LAB
EXTERNAL ABO GROUPING: NORMAL
EXTERNAL ANTIBODY SCREEN: NEGATIVE
EXTERNAL GROUP B STREP ANTIGEN: POSITIVE
EXTERNAL HEPATITIS B SURFACE ANTIGEN: NEGATIVE
EXTERNAL RH FACTOR: POSITIVE
EXTERNAL RUBELLA QUALITATIVE: NORMAL
EXTERNAL SYPHILIS RPR SCREEN: NORMAL
HIV1 P24 AG SERPL QL IA: NEGATIVE

## 2022-09-19 ENCOUNTER — ANESTHESIA EVENT (OUTPATIENT)
Dept: LABOR AND DELIVERY | Facility: HOSPITAL | Age: 27
End: 2022-09-19

## 2022-09-19 ENCOUNTER — HOSPITAL ENCOUNTER (INPATIENT)
Facility: HOSPITAL | Age: 27
LOS: 3 days | Discharge: HOME | End: 2022-09-22
Attending: OBSTETRICS & GYNECOLOGY | Admitting: OBSTETRICS & GYNECOLOGY

## 2022-09-19 ENCOUNTER — ANESTHESIA (OUTPATIENT)
Dept: LABOR AND DELIVERY | Facility: HOSPITAL | Age: 27
End: 2022-09-19

## 2022-09-19 DIAGNOSIS — Z87.442 HISTORY OF KIDNEY STONES: ICD-10-CM

## 2022-09-19 PROBLEM — Z37.9 NORMAL LABOR: Status: ACTIVE | Noted: 2022-09-19

## 2022-09-19 PROBLEM — O28.8 NST (NON-STRESS TEST) NONREACTIVE: Status: ACTIVE | Noted: 2022-09-19

## 2022-09-19 LAB
ABO GROUP BLD: NORMAL
ABO GROUP BLD: NORMAL
AMPHET+METHAMPHET UR QL: NEGATIVE
AMPHETAMINES UR QL: NEGATIVE
BARBITURATES UR QL SCN: NEGATIVE
BASOPHILS # BLD AUTO: 0.02 10*3/MM3 (ref 0–0.2)
BASOPHILS NFR BLD AUTO: 0.2 % (ref 0–1.5)
BENZODIAZ UR QL SCN: NEGATIVE
BLD GP AB SCN SERPL QL: NEGATIVE
BUPRENORPHINE SERPL-MCNC: NEGATIVE NG/ML
CANNABINOIDS SERPL QL: NEGATIVE
COCAINE UR QL: NEGATIVE
DEPRECATED RDW RBC AUTO: 41.3 FL (ref 37–54)
EOSINOPHIL # BLD AUTO: 0.16 10*3/MM3 (ref 0–0.4)
EOSINOPHIL NFR BLD AUTO: 1.2 % (ref 0.3–6.2)
ERYTHROCYTE [DISTWIDTH] IN BLOOD BY AUTOMATED COUNT: 12.9 % (ref 12.3–15.4)
HCT VFR BLD AUTO: 34.3 % (ref 34–46.6)
HGB BLD-MCNC: 11.8 G/DL (ref 12–15.9)
IMM GRANULOCYTES # BLD AUTO: 0.08 10*3/MM3 (ref 0–0.05)
IMM GRANULOCYTES NFR BLD AUTO: 0.6 % (ref 0–0.5)
LYMPHOCYTES # BLD AUTO: 2.25 10*3/MM3 (ref 0.7–3.1)
LYMPHOCYTES NFR BLD AUTO: 17 % (ref 19.6–45.3)
MCH RBC QN AUTO: 30.3 PG (ref 26.6–33)
MCHC RBC AUTO-ENTMCNC: 34.4 G/DL (ref 31.5–35.7)
MCV RBC AUTO: 88.2 FL (ref 79–97)
METHADONE UR QL SCN: NEGATIVE
MONOCYTES # BLD AUTO: 1.13 10*3/MM3 (ref 0.1–0.9)
MONOCYTES NFR BLD AUTO: 8.5 % (ref 5–12)
NEUTROPHILS NFR BLD AUTO: 72.5 % (ref 42.7–76)
NEUTROPHILS NFR BLD AUTO: 9.62 10*3/MM3 (ref 1.7–7)
NRBC BLD AUTO-RTO: 0 /100 WBC (ref 0–0.2)
OPIATES UR QL: NEGATIVE
OXYCODONE UR QL SCN: NEGATIVE
PCP UR QL SCN: NEGATIVE
PLATELET # BLD AUTO: 203 10*3/MM3 (ref 140–450)
PMV BLD AUTO: 12.9 FL (ref 6–12)
PROPOXYPH UR QL: NEGATIVE
RBC # BLD AUTO: 3.89 10*6/MM3 (ref 3.77–5.28)
RH BLD: POSITIVE
RH BLD: POSITIVE
T&S EXPIRATION DATE: NORMAL
TRICYCLICS UR QL SCN: NEGATIVE
WBC NRBC COR # BLD: 13.26 10*3/MM3 (ref 3.4–10.8)

## 2022-09-19 PROCEDURE — 0KQM0ZZ REPAIR PERINEUM MUSCLE, OPEN APPROACH: ICD-10-PCS | Performed by: OBSTETRICS & GYNECOLOGY

## 2022-09-19 PROCEDURE — 80306 DRUG TEST PRSMV INSTRMNT: CPT | Performed by: OBSTETRICS & GYNECOLOGY

## 2022-09-19 PROCEDURE — 0 LIDOCAINE 1 % SOLUTION: Performed by: NURSE ANESTHETIST, CERTIFIED REGISTERED

## 2022-09-19 PROCEDURE — 86901 BLOOD TYPING SEROLOGIC RH(D): CPT | Performed by: OBSTETRICS & GYNECOLOGY

## 2022-09-19 PROCEDURE — 86850 RBC ANTIBODY SCREEN: CPT | Performed by: OBSTETRICS & GYNECOLOGY

## 2022-09-19 PROCEDURE — G0463 HOSPITAL OUTPT CLINIC VISIT: HCPCS

## 2022-09-19 PROCEDURE — 86900 BLOOD TYPING SEROLOGIC ABO: CPT

## 2022-09-19 PROCEDURE — 86900 BLOOD TYPING SEROLOGIC ABO: CPT | Performed by: OBSTETRICS & GYNECOLOGY

## 2022-09-19 PROCEDURE — C1755 CATHETER, INTRASPINAL: HCPCS

## 2022-09-19 PROCEDURE — 3E033VJ INTRODUCTION OF OTHER HORMONE INTO PERIPHERAL VEIN, PERCUTANEOUS APPROACH: ICD-10-PCS | Performed by: OBSTETRICS & GYNECOLOGY

## 2022-09-19 PROCEDURE — 85025 COMPLETE CBC W/AUTO DIFF WBC: CPT | Performed by: OBSTETRICS & GYNECOLOGY

## 2022-09-19 PROCEDURE — C1755 CATHETER, INTRASPINAL: HCPCS | Performed by: NURSE ANESTHETIST, CERTIFIED REGISTERED

## 2022-09-19 PROCEDURE — 86901 BLOOD TYPING SEROLOGIC RH(D): CPT

## 2022-09-19 PROCEDURE — 59025 FETAL NON-STRESS TEST: CPT

## 2022-09-19 PROCEDURE — 25010000002 FENTANYL CITRATE (PF) 50 MCG/ML SOLUTION: Performed by: NURSE ANESTHETIST, CERTIFIED REGISTERED

## 2022-09-19 PROCEDURE — 25010000002 AMPICILLIN PER 500 MG: Performed by: OBSTETRICS & GYNECOLOGY

## 2022-09-19 PROCEDURE — 25010000002 OXYTOCIN PER 10 UNITS: Performed by: OBSTETRICS & GYNECOLOGY

## 2022-09-19 RX ORDER — ROPIVACAINE HYDROCHLORIDE 2 MG/ML
14 INJECTION, SOLUTION EPIDURAL; INFILTRATION; PERINEURAL CONTINUOUS
Status: DISCONTINUED | OUTPATIENT
Start: 2022-09-19 | End: 2022-09-20

## 2022-09-19 RX ORDER — SODIUM CHLORIDE 0.9 % (FLUSH) 0.9 %
3-10 SYRINGE (ML) INJECTION AS NEEDED
Status: DISCONTINUED | OUTPATIENT
Start: 2022-09-19 | End: 2022-09-20

## 2022-09-19 RX ORDER — FENTANYL CIT 0.2 MG/100ML-ROPIV 0.2%-NACL 0.9% EPIDURAL INJ 2/0.2 MCG/ML-%
SOLUTION INJECTION CONTINUOUS PRN
Status: DISCONTINUED | OUTPATIENT
Start: 2022-09-19 | End: 2022-09-19 | Stop reason: SURG

## 2022-09-19 RX ORDER — OXYTOCIN/0.9 % SODIUM CHLORIDE 30/500 ML
2-20 PLASTIC BAG, INJECTION (ML) INTRAVENOUS
Status: DISCONTINUED | OUTPATIENT
Start: 2022-09-19 | End: 2022-09-20

## 2022-09-19 RX ORDER — ONDANSETRON 2 MG/ML
4 INJECTION INTRAMUSCULAR; INTRAVENOUS ONCE AS NEEDED
Status: DISCONTINUED | OUTPATIENT
Start: 2022-09-19 | End: 2022-09-20

## 2022-09-19 RX ORDER — FENTANYL CITRATE 50 UG/ML
INJECTION, SOLUTION INTRAMUSCULAR; INTRAVENOUS AS NEEDED
Status: DISCONTINUED | OUTPATIENT
Start: 2022-09-19 | End: 2022-09-19 | Stop reason: SURG

## 2022-09-19 RX ORDER — IRON POLYSACCHARIDE COMPLEX 150 MG
150 CAPSULE ORAL DAILY
Status: DISCONTINUED | OUTPATIENT
Start: 2022-09-20 | End: 2022-09-20

## 2022-09-19 RX ORDER — PRENATAL VIT/IRON FUM/FOLIC AC 27MG-0.8MG
1 TABLET ORAL DAILY
COMMUNITY
End: 2023-03-21

## 2022-09-19 RX ORDER — ONDANSETRON 2 MG/ML
4 INJECTION INTRAMUSCULAR; INTRAVENOUS EVERY 6 HOURS PRN
Status: DISCONTINUED | OUTPATIENT
Start: 2022-09-19 | End: 2022-09-20

## 2022-09-19 RX ORDER — GLYCERIN/PROPYLENE GLYCOL/WATR
1 SOLUTION, NON-ORAL VAGINAL ONCE AS NEEDED
Status: DISCONTINUED | OUTPATIENT
Start: 2022-09-19 | End: 2022-09-20

## 2022-09-19 RX ORDER — EPHEDRINE SULFATE 5 MG/ML
10 INJECTION INTRAVENOUS
Status: DISCONTINUED | OUTPATIENT
Start: 2022-09-19 | End: 2022-09-20

## 2022-09-19 RX ORDER — FENTANYL CITRATE 50 UG/ML
INJECTION, SOLUTION INTRAMUSCULAR; INTRAVENOUS
Status: COMPLETED
Start: 2022-09-19 | End: 2022-09-19

## 2022-09-19 RX ORDER — ACETAMINOPHEN 325 MG/1
650 TABLET ORAL EVERY 4 HOURS PRN
Status: DISCONTINUED | OUTPATIENT
Start: 2022-09-19 | End: 2022-09-20

## 2022-09-19 RX ORDER — GLYCERIN/PROPYLENE GLYCOL/WATR
66.7 SOLUTION, NON-ORAL VAGINAL AS NEEDED
Status: DISCONTINUED | OUTPATIENT
Start: 2022-09-19 | End: 2022-09-20

## 2022-09-19 RX ORDER — DOCUSATE SODIUM 100 MG/1
100 CAPSULE, LIQUID FILLED ORAL 2 TIMES DAILY
COMMUNITY
End: 2022-10-28

## 2022-09-19 RX ORDER — MINERAL OIL
OIL (ML) MISCELLANEOUS ONCE
Status: DISCONTINUED | OUTPATIENT
Start: 2022-09-19 | End: 2022-09-19

## 2022-09-19 RX ORDER — LIDOCAINE HYDROCHLORIDE 10 MG/ML
INJECTION, SOLUTION INFILTRATION; PERINEURAL AS NEEDED
Status: DISCONTINUED | OUTPATIENT
Start: 2022-09-19 | End: 2022-09-19 | Stop reason: SURG

## 2022-09-19 RX ORDER — FAMOTIDINE 10 MG/ML
20 INJECTION, SOLUTION INTRAVENOUS ONCE AS NEEDED
Status: DISCONTINUED | OUTPATIENT
Start: 2022-09-19 | End: 2022-09-20

## 2022-09-19 RX ORDER — MAGNESIUM HYDROXIDE 1200 MG/15ML
1000 LIQUID ORAL ONCE AS NEEDED
Status: DISCONTINUED | OUTPATIENT
Start: 2022-09-19 | End: 2022-09-20

## 2022-09-19 RX ORDER — TERBUTALINE SULFATE 1 MG/ML
0.2 INJECTION, SOLUTION SUBCUTANEOUS AS NEEDED
Status: DISCONTINUED | OUTPATIENT
Start: 2022-09-19 | End: 2022-09-20

## 2022-09-19 RX ORDER — DOCUSATE SODIUM 100 MG/1
100 CAPSULE, LIQUID FILLED ORAL 2 TIMES DAILY
Status: DISCONTINUED | OUTPATIENT
Start: 2022-09-19 | End: 2022-09-20

## 2022-09-19 RX ORDER — ONDANSETRON 4 MG/1
4 TABLET, FILM COATED ORAL EVERY 6 HOURS PRN
Status: DISCONTINUED | OUTPATIENT
Start: 2022-09-19 | End: 2022-09-20

## 2022-09-19 RX ORDER — IRON POLYSACCHARIDE COMPLEX 150 MG
150 CAPSULE ORAL DAILY
COMMUNITY
End: 2022-09-22 | Stop reason: HOSPADM

## 2022-09-19 RX ORDER — PRENATAL VIT/IRON FUM/FOLIC AC 27MG-0.8MG
1 TABLET ORAL DAILY
Status: DISCONTINUED | OUTPATIENT
Start: 2022-09-20 | End: 2022-09-20

## 2022-09-19 RX ORDER — FENTANYL CIT 0.2 MG/100ML-ROPIV 0.2%-NACL 0.9% EPIDURAL INJ 2/0.2 MCG/ML-%
SOLUTION INJECTION
Status: COMPLETED
Start: 2022-09-19 | End: 2022-09-19

## 2022-09-19 RX ORDER — BUTORPHANOL TARTRATE 1 MG/ML
1 INJECTION, SOLUTION INTRAMUSCULAR; INTRAVENOUS
Status: DISCONTINUED | OUTPATIENT
Start: 2022-09-19 | End: 2022-09-20

## 2022-09-19 RX ORDER — SODIUM CHLORIDE, SODIUM LACTATE, POTASSIUM CHLORIDE, CALCIUM CHLORIDE 600; 310; 30; 20 MG/100ML; MG/100ML; MG/100ML; MG/100ML
125 INJECTION, SOLUTION INTRAVENOUS CONTINUOUS
Status: DISCONTINUED | OUTPATIENT
Start: 2022-09-19 | End: 2022-09-20

## 2022-09-19 RX ADMIN — SODIUM CHLORIDE, POTASSIUM CHLORIDE, SODIUM LACTATE AND CALCIUM CHLORIDE 125 ML/HR: 600; 310; 30; 20 INJECTION, SOLUTION INTRAVENOUS at 16:51

## 2022-09-19 RX ADMIN — AMPICILLIN SODIUM 2 G: 2 INJECTION, POWDER, FOR SOLUTION INTRAVENOUS at 16:51

## 2022-09-19 RX ADMIN — FENTANYL CIT 0.2 MG/100ML-ROPIV 0.2%-NACL 0.9% EPIDURAL INJ 14 ML/HR: 2/0.2 SOLUTION at 18:29

## 2022-09-19 RX ADMIN — FENTANYL CITRATE 100 MCG: 50 INJECTION, SOLUTION INTRAMUSCULAR; INTRAVENOUS at 18:29

## 2022-09-19 RX ADMIN — LIDOCAINE HYDROCHLORIDE 3 ML: 10 INJECTION, SOLUTION INFILTRATION; PERINEURAL at 18:28

## 2022-09-19 RX ADMIN — SODIUM CHLORIDE, POTASSIUM CHLORIDE, SODIUM LACTATE AND CALCIUM CHLORIDE 125 ML/HR: 600; 310; 30; 20 INJECTION, SOLUTION INTRAVENOUS at 18:52

## 2022-09-19 RX ADMIN — AMPICILLIN SODIUM 1 G: 1 INJECTION, POWDER, FOR SOLUTION INTRAMUSCULAR; INTRAVENOUS at 21:22

## 2022-09-19 RX ADMIN — OXYTOCIN 2 MILLI-UNITS/MIN: 10 INJECTION INTRAVENOUS at 17:48

## 2022-09-19 NOTE — ANESTHESIA PROCEDURE NOTES
Labor Epidural      Patient reassessed immediately prior to procedure    Patient location during procedure: OB  Indication:at surgeon's request  Performed By  CRNA/CAA: Farhan Boyer CRNA  Preanesthetic Checklist  Completed: patient identified, IV checked, site marked, risks and benefits discussed, surgical consent, monitors and equipment checked, pre-op evaluation and timeout performed  Additional Notes  Negative paresthesia / hem / CSF, cathater placed with ease, secured in place. Bolus given and infusion started, pt tolerated well.  Prep:  Pt Position:sitting  Sterile Tech:cap, gloves, mask and sterile barrier  Prep:povidone-iodine 7.5% surgical scrub  Monitoring:blood pressure monitoring and continuous pulse oximetry  Epidural Block Procedure:  Approach:midline  Guidance:landmark technique  Location:L4-L5  Needle Type:Tuohy  Needle Gauge:18 G  Loss of Resistance Medium: air  Loss of Resistance: 7cm  Cath Depth at skin:12 cm  Paresthesia: none  Aspiration:negative  Test Dose:negative  Number of Attempts: 1  Post Assessment:  Dressing:secured with tape and occlusive dressing applied  Pt Tolerance:patient tolerated the procedure well with no apparent complications  Complications:no

## 2022-09-20 LAB
BASOPHILS # BLD AUTO: 0.03 10*3/MM3 (ref 0–0.2)
BASOPHILS NFR BLD AUTO: 0.1 % (ref 0–1.5)
DEPRECATED RDW RBC AUTO: 41.5 FL (ref 37–54)
EOSINOPHIL # BLD AUTO: 0.08 10*3/MM3 (ref 0–0.4)
EOSINOPHIL NFR BLD AUTO: 0.4 % (ref 0.3–6.2)
ERYTHROCYTE [DISTWIDTH] IN BLOOD BY AUTOMATED COUNT: 12.7 % (ref 12.3–15.4)
HCT VFR BLD AUTO: 34.1 % (ref 34–46.6)
HGB BLD-MCNC: 11.7 G/DL (ref 12–15.9)
IMM GRANULOCYTES # BLD AUTO: 0.1 10*3/MM3 (ref 0–0.05)
IMM GRANULOCYTES NFR BLD AUTO: 0.5 % (ref 0–0.5)
LYMPHOCYTES # BLD AUTO: 2.48 10*3/MM3 (ref 0.7–3.1)
LYMPHOCYTES NFR BLD AUTO: 11.7 % (ref 19.6–45.3)
MCH RBC QN AUTO: 30.7 PG (ref 26.6–33)
MCHC RBC AUTO-ENTMCNC: 34.3 G/DL (ref 31.5–35.7)
MCV RBC AUTO: 89.5 FL (ref 79–97)
MONOCYTES # BLD AUTO: 1.79 10*3/MM3 (ref 0.1–0.9)
MONOCYTES NFR BLD AUTO: 8.5 % (ref 5–12)
NEUTROPHILS NFR BLD AUTO: 16.66 10*3/MM3 (ref 1.7–7)
NEUTROPHILS NFR BLD AUTO: 78.8 % (ref 42.7–76)
NRBC BLD AUTO-RTO: 0 /100 WBC (ref 0–0.2)
PLATELET # BLD AUTO: 202 10*3/MM3 (ref 140–450)
PMV BLD AUTO: 12.8 FL (ref 6–12)
RBC # BLD AUTO: 3.81 10*6/MM3 (ref 3.77–5.28)
WBC NRBC COR # BLD: 21.14 10*3/MM3 (ref 3.4–10.8)

## 2022-09-20 PROCEDURE — 85025 COMPLETE CBC W/AUTO DIFF WBC: CPT | Performed by: OBSTETRICS & GYNECOLOGY

## 2022-09-20 PROCEDURE — 63710000001 ONDANSETRON PER 8 MG: Performed by: OBSTETRICS & GYNECOLOGY

## 2022-09-20 RX ORDER — HYDROCORTISONE 25 MG/G
1 CREAM TOPICAL AS NEEDED
Status: DISCONTINUED | OUTPATIENT
Start: 2022-09-20 | End: 2022-09-22 | Stop reason: HOSPADM

## 2022-09-20 RX ORDER — OXYTOCIN/0.9 % SODIUM CHLORIDE 30/500 ML
250 PLASTIC BAG, INJECTION (ML) INTRAVENOUS CONTINUOUS
Status: ACTIVE | OUTPATIENT
Start: 2022-09-20 | End: 2022-09-20

## 2022-09-20 RX ORDER — CARBOPROST TROMETHAMINE 250 UG/ML
250 INJECTION, SOLUTION INTRAMUSCULAR AS NEEDED
Status: DISCONTINUED | OUTPATIENT
Start: 2022-09-20 | End: 2022-09-20 | Stop reason: HOSPADM

## 2022-09-20 RX ORDER — ONDANSETRON 2 MG/ML
4 INJECTION INTRAMUSCULAR; INTRAVENOUS EVERY 6 HOURS PRN
Status: DISCONTINUED | OUTPATIENT
Start: 2022-09-20 | End: 2022-09-22 | Stop reason: HOSPADM

## 2022-09-20 RX ORDER — BISACODYL 10 MG
10 SUPPOSITORY, RECTAL RECTAL DAILY PRN
Status: DISCONTINUED | OUTPATIENT
Start: 2022-09-20 | End: 2022-09-22 | Stop reason: HOSPADM

## 2022-09-20 RX ORDER — SODIUM CHLORIDE 0.9 % (FLUSH) 0.9 %
1-10 SYRINGE (ML) INJECTION AS NEEDED
Status: DISCONTINUED | OUTPATIENT
Start: 2022-09-20 | End: 2022-09-22 | Stop reason: HOSPADM

## 2022-09-20 RX ORDER — HYDROCODONE BITARTRATE AND ACETAMINOPHEN 5; 325 MG/1; MG/1
1 TABLET ORAL EVERY 4 HOURS PRN
Status: DISCONTINUED | OUTPATIENT
Start: 2022-09-20 | End: 2022-09-22 | Stop reason: HOSPADM

## 2022-09-20 RX ORDER — HYDROCODONE BITARTRATE AND ACETAMINOPHEN 5; 325 MG/1; MG/1
1 TABLET ORAL EVERY 4 HOURS PRN
Status: DISCONTINUED | OUTPATIENT
Start: 2022-09-20 | End: 2022-09-20 | Stop reason: HOSPADM

## 2022-09-20 RX ORDER — CARBOPROST TROMETHAMINE 250 UG/ML
250 INJECTION, SOLUTION INTRAMUSCULAR
Status: DISCONTINUED | OUTPATIENT
Start: 2022-09-20 | End: 2022-09-22 | Stop reason: HOSPADM

## 2022-09-20 RX ORDER — METHYLERGONOVINE MALEATE 0.2 MG/ML
200 INJECTION INTRAVENOUS ONCE AS NEEDED
Status: DISCONTINUED | OUTPATIENT
Start: 2022-09-20 | End: 2022-09-20 | Stop reason: HOSPADM

## 2022-09-20 RX ORDER — ONDANSETRON 4 MG/1
4 TABLET, FILM COATED ORAL EVERY 6 HOURS PRN
Status: DISCONTINUED | OUTPATIENT
Start: 2022-09-20 | End: 2022-09-22 | Stop reason: HOSPADM

## 2022-09-20 RX ORDER — LABETALOL 200 MG/1
200 TABLET, FILM COATED ORAL EVERY 12 HOURS SCHEDULED
Status: DISCONTINUED | OUTPATIENT
Start: 2022-09-20 | End: 2022-09-22 | Stop reason: HOSPADM

## 2022-09-20 RX ORDER — IBUPROFEN 800 MG/1
800 TABLET ORAL EVERY 8 HOURS SCHEDULED
Status: DISCONTINUED | OUTPATIENT
Start: 2022-09-20 | End: 2022-09-20

## 2022-09-20 RX ORDER — PRENATAL VIT/IRON FUM/FOLIC AC 27MG-0.8MG
1 TABLET ORAL DAILY
Status: DISCONTINUED | OUTPATIENT
Start: 2022-09-20 | End: 2022-09-22 | Stop reason: HOSPADM

## 2022-09-20 RX ORDER — IBUPROFEN 800 MG/1
800 TABLET ORAL EVERY 8 HOURS SCHEDULED
Status: DISCONTINUED | OUTPATIENT
Start: 2022-09-20 | End: 2022-09-22 | Stop reason: HOSPADM

## 2022-09-20 RX ORDER — HYDROCORTISONE ACETATE PRAMOXINE HCL 1; 1 G/100G; G/100G
1 CREAM TOPICAL AS NEEDED
Status: DISCONTINUED | OUTPATIENT
Start: 2022-09-20 | End: 2022-09-22 | Stop reason: HOSPADM

## 2022-09-20 RX ORDER — OXYTOCIN/0.9 % SODIUM CHLORIDE 30/500 ML
999 PLASTIC BAG, INJECTION (ML) INTRAVENOUS ONCE
Status: DISCONTINUED | OUTPATIENT
Start: 2022-09-20 | End: 2022-09-20 | Stop reason: HOSPADM

## 2022-09-20 RX ORDER — OXYTOCIN/0.9 % SODIUM CHLORIDE 30/500 ML
125 PLASTIC BAG, INJECTION (ML) INTRAVENOUS CONTINUOUS PRN
Status: DISCONTINUED | OUTPATIENT
Start: 2022-09-20 | End: 2022-09-22 | Stop reason: HOSPADM

## 2022-09-20 RX ORDER — ACETAMINOPHEN 325 MG/1
650 TABLET ORAL EVERY 4 HOURS PRN
Status: DISCONTINUED | OUTPATIENT
Start: 2022-09-20 | End: 2022-09-20 | Stop reason: HOSPADM

## 2022-09-20 RX ORDER — METHYLERGONOVINE MALEATE 0.2 MG/ML
200 INJECTION INTRAVENOUS ONCE AS NEEDED
Status: DISCONTINUED | OUTPATIENT
Start: 2022-09-20 | End: 2022-09-22 | Stop reason: HOSPADM

## 2022-09-20 RX ORDER — MISOPROSTOL 100 UG/1
600 TABLET ORAL AS NEEDED
Status: DISCONTINUED | OUTPATIENT
Start: 2022-09-20 | End: 2022-09-20 | Stop reason: HOSPADM

## 2022-09-20 RX ORDER — IBUPROFEN 800 MG/1
800 TABLET ORAL EVERY 8 HOURS SCHEDULED
Status: DISCONTINUED | OUTPATIENT
Start: 2022-09-20 | End: 2022-09-20 | Stop reason: SDUPTHER

## 2022-09-20 RX ORDER — HYDROXYZINE 50 MG/1
50 TABLET, FILM COATED ORAL NIGHTLY PRN
Status: DISCONTINUED | OUTPATIENT
Start: 2022-09-20 | End: 2022-09-22 | Stop reason: HOSPADM

## 2022-09-20 RX ORDER — DOCUSATE SODIUM 100 MG/1
100 CAPSULE, LIQUID FILLED ORAL 2 TIMES DAILY
Status: DISCONTINUED | OUTPATIENT
Start: 2022-09-20 | End: 2022-09-22 | Stop reason: HOSPADM

## 2022-09-20 RX ORDER — MISOPROSTOL 100 UG/1
600 TABLET ORAL ONCE AS NEEDED
Status: DISCONTINUED | OUTPATIENT
Start: 2022-09-20 | End: 2022-09-22 | Stop reason: HOSPADM

## 2022-09-20 RX ADMIN — IBUPROFEN 800 MG: 800 TABLET, FILM COATED ORAL at 08:09

## 2022-09-20 RX ADMIN — HYDROCORTISONE 2.5% 1 APPLICATION: 25 CREAM TOPICAL at 03:07

## 2022-09-20 RX ADMIN — ONDANSETRON HYDROCHLORIDE 4 MG: 4 TABLET, FILM COATED ORAL at 00:31

## 2022-09-20 RX ADMIN — IBUPROFEN 800 MG: 800 TABLET, FILM COATED ORAL at 14:57

## 2022-09-20 RX ADMIN — Medication: at 03:07

## 2022-09-20 RX ADMIN — IBUPROFEN 800 MG: 800 TABLET, FILM COATED ORAL at 00:31

## 2022-09-20 RX ADMIN — HYDROCODONE BITARTRATE AND ACETAMINOPHEN 1 TABLET: 5; 325 TABLET ORAL at 00:31

## 2022-09-20 RX ADMIN — IBUPROFEN 800 MG: 800 TABLET, FILM COATED ORAL at 21:11

## 2022-09-20 RX ADMIN — DOCUSATE SODIUM 100 MG: 100 CAPSULE ORAL at 08:09

## 2022-09-20 RX ADMIN — WITCH HAZEL 1 PAD: 500 SOLUTION RECTAL; TOPICAL at 03:07

## 2022-09-20 RX ADMIN — DOCUSATE SODIUM 100 MG: 100 CAPSULE ORAL at 21:11

## 2022-09-20 RX ADMIN — LABETALOL HYDROCHLORIDE 200 MG: 200 TABLET, FILM COATED ORAL at 09:55

## 2022-09-21 PROCEDURE — 63710000001 ONDANSETRON PER 8 MG: Performed by: OBSTETRICS & GYNECOLOGY

## 2022-09-21 RX ADMIN — Medication: at 05:08

## 2022-09-21 RX ADMIN — HYDROCORTISONE 2.5% 1 APPLICATION: 25 CREAM TOPICAL at 21:34

## 2022-09-21 RX ADMIN — IBUPROFEN 800 MG: 800 TABLET, FILM COATED ORAL at 05:08

## 2022-09-21 RX ADMIN — WITCH HAZEL 1 PAD: 500 SOLUTION RECTAL; TOPICAL at 21:34

## 2022-09-21 RX ADMIN — ONDANSETRON HYDROCHLORIDE 4 MG: 4 TABLET, FILM COATED ORAL at 07:19

## 2022-09-21 RX ADMIN — IBUPROFEN 800 MG: 800 TABLET, FILM COATED ORAL at 21:34

## 2022-09-21 RX ADMIN — DOCUSATE SODIUM 100 MG: 100 CAPSULE ORAL at 21:35

## 2022-09-21 RX ADMIN — LABETALOL HYDROCHLORIDE 200 MG: 200 TABLET, FILM COATED ORAL at 15:16

## 2022-09-21 RX ADMIN — DOCUSATE SODIUM 100 MG: 100 CAPSULE ORAL at 09:01

## 2022-09-21 RX ADMIN — BENZOCAINE 1 APPLICATION: 5.6 OINTMENT TOPICAL at 21:34

## 2022-09-21 RX ADMIN — IBUPROFEN 800 MG: 800 TABLET, FILM COATED ORAL at 13:58

## 2022-09-21 RX ADMIN — WITCH HAZEL 1 PAD: 500 SOLUTION RECTAL; TOPICAL at 05:08

## 2022-09-22 VITALS
OXYGEN SATURATION: 98 % | WEIGHT: 207 LBS | SYSTOLIC BLOOD PRESSURE: 120 MMHG | BODY MASS INDEX: 28.98 KG/M2 | TEMPERATURE: 98.6 F | HEIGHT: 71 IN | HEART RATE: 97 BPM | DIASTOLIC BLOOD PRESSURE: 77 MMHG | RESPIRATION RATE: 18 BRPM

## 2022-09-22 LAB
BASOPHILS # BLD AUTO: 0.04 10*3/MM3 (ref 0–0.2)
BASOPHILS NFR BLD AUTO: 0.3 % (ref 0–1.5)
DEPRECATED RDW RBC AUTO: 43.3 FL (ref 37–54)
EOSINOPHIL # BLD AUTO: 0.48 10*3/MM3 (ref 0–0.4)
EOSINOPHIL NFR BLD AUTO: 3.8 % (ref 0.3–6.2)
ERYTHROCYTE [DISTWIDTH] IN BLOOD BY AUTOMATED COUNT: 13.3 % (ref 12.3–15.4)
HCT VFR BLD AUTO: 34.7 % (ref 34–46.6)
HGB BLD-MCNC: 11.8 G/DL (ref 12–15.9)
IMM GRANULOCYTES # BLD AUTO: 0.06 10*3/MM3 (ref 0–0.05)
IMM GRANULOCYTES NFR BLD AUTO: 0.5 % (ref 0–0.5)
LYMPHOCYTES # BLD AUTO: 2.11 10*3/MM3 (ref 0.7–3.1)
LYMPHOCYTES NFR BLD AUTO: 16.5 % (ref 19.6–45.3)
MCH RBC QN AUTO: 30.6 PG (ref 26.6–33)
MCHC RBC AUTO-ENTMCNC: 34 G/DL (ref 31.5–35.7)
MCV RBC AUTO: 89.9 FL (ref 79–97)
MONOCYTES # BLD AUTO: 0.58 10*3/MM3 (ref 0.1–0.9)
MONOCYTES NFR BLD AUTO: 4.5 % (ref 5–12)
NEUTROPHILS NFR BLD AUTO: 74.4 % (ref 42.7–76)
NEUTROPHILS NFR BLD AUTO: 9.5 10*3/MM3 (ref 1.7–7)
NRBC BLD AUTO-RTO: 0 /100 WBC (ref 0–0.2)
PLATELET # BLD AUTO: 196 10*3/MM3 (ref 140–450)
PMV BLD AUTO: 12.5 FL (ref 6–12)
RBC # BLD AUTO: 3.86 10*6/MM3 (ref 3.77–5.28)
WBC NRBC COR # BLD: 12.77 10*3/MM3 (ref 3.4–10.8)

## 2022-09-22 PROCEDURE — 85025 COMPLETE CBC W/AUTO DIFF WBC: CPT | Performed by: NURSE PRACTITIONER

## 2022-09-22 RX ORDER — LABETALOL 200 MG/1
200 TABLET, FILM COATED ORAL DAILY
Qty: 30 TABLET | Refills: 0 | Status: SHIPPED | OUTPATIENT
Start: 2022-09-22 | End: 2022-10-28

## 2022-09-22 RX ORDER — IBUPROFEN 800 MG/1
800 TABLET ORAL EVERY 8 HOURS PRN
Qty: 40 TABLET | Refills: 1 | Status: SHIPPED | OUTPATIENT
Start: 2022-09-22 | End: 2022-10-28

## 2022-09-22 RX ADMIN — LABETALOL HYDROCHLORIDE 200 MG: 200 TABLET, FILM COATED ORAL at 08:29

## 2022-09-22 RX ADMIN — WITCH HAZEL 1 PAD: 500 SOLUTION RECTAL; TOPICAL at 13:38

## 2022-09-22 RX ADMIN — IBUPROFEN 800 MG: 800 TABLET, FILM COATED ORAL at 05:45

## 2022-09-22 RX ADMIN — DOCUSATE SODIUM 100 MG: 100 CAPSULE ORAL at 08:29

## 2022-09-22 RX ADMIN — IBUPROFEN 800 MG: 800 TABLET, FILM COATED ORAL at 13:38

## 2022-10-28 ENCOUNTER — OFFICE VISIT (OUTPATIENT)
Dept: UROLOGY | Facility: CLINIC | Age: 27
End: 2022-10-28

## 2022-10-28 VITALS — BODY MASS INDEX: 25.77 KG/M2 | WEIGHT: 180 LBS | HEIGHT: 70 IN

## 2022-10-28 DIAGNOSIS — R35.0 FREQUENCY OF URINATION: Primary | ICD-10-CM

## 2022-10-28 DIAGNOSIS — R10.9 LEFT FLANK PAIN: ICD-10-CM

## 2022-10-28 LAB
BILIRUB BLD-MCNC: NEGATIVE MG/DL
CLARITY, POC: CLEAR
COLOR UR: YELLOW
EXPIRATION DATE: ABNORMAL
GLUCOSE UR STRIP-MCNC: NEGATIVE MG/DL
KETONES UR QL: NEGATIVE
LEUKOCYTE EST, POC: ABNORMAL
Lab: ABNORMAL
NITRITE UR-MCNC: NEGATIVE MG/ML
PH UR: 6.5 [PH] (ref 5–8)
PROT UR STRIP-MCNC: NEGATIVE MG/DL
RBC # UR STRIP: NEGATIVE /UL
SP GR UR: 1.01 (ref 1–1.03)
UROBILINOGEN UR QL: NORMAL

## 2022-10-28 PROCEDURE — 99203 OFFICE O/P NEW LOW 30 MIN: CPT

## 2022-10-28 PROCEDURE — 81003 URINALYSIS AUTO W/O SCOPE: CPT

## 2022-10-28 NOTE — PROGRESS NOTES
"Chief Complaint:    Chief Complaint   Patient presents with   • Blood in Urine   • Nephrolithiasis     New patient        Vital Signs:   Ht 177.8 cm (70\")   Wt 81.6 kg (180 lb)   BMI 25.83 kg/m²   Body mass index is 25.83 kg/m².      HPI:  Sergo Solis is a 27 y.o. female who presents today for initial evaluation     History of Present Illness  Mrs. Solis presents to the clinic for left flank pain.  Patient recently went into labor and delivered her first child roughly 1 month ago.  During the course of her hospital stay urine was positive for GBS for which he underwent antibiotic treatment for.  Shortly after discharge patient was diagnosed with mastitis and was given a course of dicloxacillin.  She has reported significant left-sided back and flank pain throughout pregnancy and here recently.  She reports she has had a history of a kidney stone that was identified on x-ray roughly 1 year ago in the left renal pelvis that was roughly 8 mm.  She denies any other history of kidney stones or passing kidney stones.  UA today shows only 2+ leukocytes with negative nitrites.  I will culture the patient's urine.  Patient states that she would like to undergo a CT scan to check for kidney stone.      Past Medical History:  Past Medical History:   Diagnosis Date   • Blood in urine    • Raynaud disease        Current Meds:  Current Outpatient Medications   Medication Sig Dispense Refill   • Prenatal Vit-Fe Fumarate-FA (prenatal vitamin 27-0.8) 27-0.8 MG tablet tablet Take 1 tablet by mouth Daily.       No current facility-administered medications for this visit.        Allergies:   Allergies   Allergen Reactions   • Azithromycin Hives   • Pork-Derived Products Unknown - Low Severity   • Shellfish-Derived Products Unknown - High Severity        Past Surgical History:  Past Surgical History:   Procedure Laterality Date   • WISDOM TOOTH EXTRACTION Bilateral        Social History:  Social History     Socioeconomic " History   • Marital status:    Tobacco Use   • Smoking status: Never   • Smokeless tobacco: Never   Substance and Sexual Activity   • Alcohol use: No   • Drug use: No   • Sexual activity: Defer       Family History:  Family History   Problem Relation Age of Onset   • Hypertension Mother    • No Known Problems Father    • Hypertension Maternal Grandmother    • Thyroid disease Maternal Grandmother    • Hyperlipidemia Maternal Grandmother    • Hypertension Maternal Grandfather    • Cancer Maternal Grandfather    • Diabetes Paternal Grandmother    • Hypertension Paternal Grandmother    • Thyroid disease Paternal Grandmother    • Parkinsonism Paternal Grandmother    • Hypertension Paternal Grandfather        Review of Systems:  Review of Systems   Constitutional: Positive for fatigue. Negative for fever and unexpected weight change.   HENT: Negative for sinus pressure and sinus pain.    Eyes: Negative for pain and redness.   Respiratory: Negative for chest tightness and shortness of breath.    Cardiovascular: Negative for chest pain.   Gastrointestinal: Positive for constipation. Negative for abdominal pain, diarrhea, nausea and vomiting.   Endocrine: Negative for heat intolerance.   Genitourinary: Positive for flank pain, hematuria and urgency. Negative for difficulty urinating, dysuria and frequency.   Musculoskeletal: Negative for back pain.   Skin: Negative for rash.   Allergic/Immunologic: Negative for food allergies.   Neurological: Negative for headaches.   Hematological: Does not bruise/bleed easily.   Psychiatric/Behavioral: Negative for confusion and suicidal ideas. The patient is not nervous/anxious.        Physical Exam:  Physical Exam  Constitutional:       General: She is not in acute distress.     Appearance: Normal appearance.   HENT:      Head: Normocephalic and atraumatic.      Nose: Nose normal.      Mouth/Throat:      Mouth: Mucous membranes are moist.   Eyes:      Conjunctiva/sclera:  Conjunctivae normal.   Cardiovascular:      Rate and Rhythm: Normal rate and regular rhythm.      Pulses: Normal pulses.      Heart sounds: Normal heart sounds.   Pulmonary:      Effort: Pulmonary effort is normal.      Breath sounds: Normal breath sounds.   Abdominal:      General: Bowel sounds are normal.      Palpations: Abdomen is soft.   Musculoskeletal:         General: Normal range of motion.      Cervical back: Normal range of motion.   Skin:     General: Skin is warm.   Neurological:      General: No focal deficit present.      Mental Status: She is alert and oriented to person, place, and time.   Psychiatric:         Mood and Affect: Mood normal.         Behavior: Behavior normal.         Thought Content: Thought content normal.         Judgment: Judgment normal.         Recent Image (CT and/or KUB):   CT Abdomen and Pelvis: No results found for this or any previous visit.     CT Stone Protocol: No results found for this or any previous visit.     KUB: No results found for this or any previous visit.       Labs:  Brief Urine Lab Results  (Last result in the past 365 days)      Color   Clarity   Blood   Leuk Est   Nitrite   Protein   CREAT   Urine HCG        10/28/22 1310 Yellow   Clear   Negative   Moderate (2+)   Negative   Negative               Office Visit on 10/28/2022   Component Date Value Ref Range Status   • Color 10/28/2022 Yellow  Yellow, Straw, Dark Yellow, Sally Final   • Clarity, UA 10/28/2022 Clear  Clear Final   • Specific Gravity  10/28/2022 1.015  1.005 - 1.030 Final   • pH, Urine 10/28/2022 6.5  5.0 - 8.0 Final   • Leukocytes 10/28/2022 Moderate (2+) (A)  Negative Final   • Nitrite, UA 10/28/2022 Negative  Negative Final   • Protein, POC 10/28/2022 Negative  Negative mg/dL Final   • Glucose, UA 10/28/2022 Negative  Negative mg/dL Final   • Ketones, UA 10/28/2022 Negative  Negative Final   • Urobilinogen, UA 10/28/2022 Normal  Normal, 0.2 E.U./dL Final   • Bilirubin 10/28/2022 Negative   Negative Final   • Blood, UA 10/28/2022 Negative  Negative Final   • Lot Number 10/28/2022 [id number]   Final   • Expiration Date 10/28/2022 [id number]   Final   Admission on 09/19/2022, Discharged on 09/22/2022   Component Date Value Ref Range Status   • THC, Screen, Urine 09/19/2022 Negative  Negative Final   • Phencyclidine (PCP), Urine 09/19/2022 Negative  Negative Final   • Cocaine Screen, Urine 09/19/2022 Negative  Negative Final   • Methamphetamine, Ur 09/19/2022 Negative  Negative Final   • Opiate Screen 09/19/2022 Negative  Negative Final   • Amphetamine Screen, Urine 09/19/2022 Negative  Negative Final   • Benzodiazepine Screen, Urine 09/19/2022 Negative  Negative Final   • Tricyclic Antidepressants Screen 09/19/2022 Negative  Negative Final   • Methadone Screen, Urine 09/19/2022 Negative  Negative Final   • Barbiturates Screen, Urine 09/19/2022 Negative  Negative Final   • Oxycodone Screen, Urine 09/19/2022 Negative  Negative Final   • Propoxyphene Screen 09/19/2022 Negative  Negative Final   • Buprenorphine, Screen, Urine 09/19/2022 Negative  Negative Final   • ABO Type 09/19/2022 B   Final   • RH type 09/19/2022 Positive   Final   • Antibody Screen 09/19/2022 Negative   Final   • T&S Expiration Date 09/19/2022 9/22/2022 11:59:59 PM   Final   • WBC 09/19/2022 13.26 (H)  3.40 - 10.80 10*3/mm3 Final   • RBC 09/19/2022 3.89  3.77 - 5.28 10*6/mm3 Final   • Hemoglobin 09/19/2022 11.8 (L)  12.0 - 15.9 g/dL Final   • Hematocrit 09/19/2022 34.3  34.0 - 46.6 % Final   • MCV 09/19/2022 88.2  79.0 - 97.0 fL Final   • MCH 09/19/2022 30.3  26.6 - 33.0 pg Final   • MCHC 09/19/2022 34.4  31.5 - 35.7 g/dL Final   • RDW 09/19/2022 12.9  12.3 - 15.4 % Final   • RDW-SD 09/19/2022 41.3  37.0 - 54.0 fl Final   • MPV 09/19/2022 12.9 (H)  6.0 - 12.0 fL Final   • Platelets 09/19/2022 203  140 - 450 10*3/mm3 Final   • Neutrophil % 09/19/2022 72.5  42.7 - 76.0 % Final   • Lymphocyte % 09/19/2022 17.0 (L)  19.6 - 45.3 % Final    • Monocyte % 09/19/2022 8.5  5.0 - 12.0 % Final   • Eosinophil % 09/19/2022 1.2  0.3 - 6.2 % Final   • Basophil % 09/19/2022 0.2  0.0 - 1.5 % Final   • Immature Grans % 09/19/2022 0.6 (H)  0.0 - 0.5 % Final   • Neutrophils, Absolute 09/19/2022 9.62 (H)  1.70 - 7.00 10*3/mm3 Final   • Lymphocytes, Absolute 09/19/2022 2.25  0.70 - 3.10 10*3/mm3 Final   • Monocytes, Absolute 09/19/2022 1.13 (H)  0.10 - 0.90 10*3/mm3 Final   • Eosinophils, Absolute 09/19/2022 0.16  0.00 - 0.40 10*3/mm3 Final   • Basophils, Absolute 09/19/2022 0.02  0.00 - 0.20 10*3/mm3 Final   • Immature Grans, Absolute 09/19/2022 0.08 (H)  0.00 - 0.05 10*3/mm3 Final   • nRBC 09/19/2022 0.0  0.0 - 0.2 /100 WBC Final   • External Strep Group B Ag 03/28/2022 Positive   Final   • External Antibody Screen 03/28/2022 Negative   Final   • External ABO Grouping 03/28/2022 B   Final   • External Rh Factor 03/28/2022 Positive   Final   • External Hepatitis B Surface Ag 03/28/2022 Negative   Final   • External RPR 03/28/2022 Non-Reactive   Final   • External Rubella Qual 03/28/2022 Immune   Final   • External HIV Antibody 03/28/2022 Negative   Final   • ABO Type 09/19/2022 B   Final   • RH type 09/19/2022 Positive   Final   • WBC 09/20/2022 21.14 (H)  3.40 - 10.80 10*3/mm3 Final   • RBC 09/20/2022 3.81  3.77 - 5.28 10*6/mm3 Final   • Hemoglobin 09/20/2022 11.7 (L)  12.0 - 15.9 g/dL Final   • Hematocrit 09/20/2022 34.1  34.0 - 46.6 % Final   • MCV 09/20/2022 89.5  79.0 - 97.0 fL Final   • MCH 09/20/2022 30.7  26.6 - 33.0 pg Final   • MCHC 09/20/2022 34.3  31.5 - 35.7 g/dL Final   • RDW 09/20/2022 12.7  12.3 - 15.4 % Final   • RDW-SD 09/20/2022 41.5  37.0 - 54.0 fl Final   • MPV 09/20/2022 12.8 (H)  6.0 - 12.0 fL Final   • Platelets 09/20/2022 202  140 - 450 10*3/mm3 Final   • Neutrophil % 09/20/2022 78.8 (H)  42.7 - 76.0 % Final   • Lymphocyte % 09/20/2022 11.7 (L)  19.6 - 45.3 % Final   • Monocyte % 09/20/2022 8.5  5.0 - 12.0 % Final   • Eosinophil % 09/20/2022  0.4  0.3 - 6.2 % Final   • Basophil % 09/20/2022 0.1  0.0 - 1.5 % Final   • Immature Grans % 09/20/2022 0.5  0.0 - 0.5 % Final   • Neutrophils, Absolute 09/20/2022 16.66 (H)  1.70 - 7.00 10*3/mm3 Final   • Lymphocytes, Absolute 09/20/2022 2.48  0.70 - 3.10 10*3/mm3 Final   • Monocytes, Absolute 09/20/2022 1.79 (H)  0.10 - 0.90 10*3/mm3 Final   • Eosinophils, Absolute 09/20/2022 0.08  0.00 - 0.40 10*3/mm3 Final   • Basophils, Absolute 09/20/2022 0.03  0.00 - 0.20 10*3/mm3 Final   • Immature Grans, Absolute 09/20/2022 0.10 (H)  0.00 - 0.05 10*3/mm3 Final   • nRBC 09/20/2022 0.0  0.0 - 0.2 /100 WBC Final   • WBC 09/22/2022 12.77 (H)  3.40 - 10.80 10*3/mm3 Final   • RBC 09/22/2022 3.86  3.77 - 5.28 10*6/mm3 Final   • Hemoglobin 09/22/2022 11.8 (L)  12.0 - 15.9 g/dL Final   • Hematocrit 09/22/2022 34.7  34.0 - 46.6 % Final   • MCV 09/22/2022 89.9  79.0 - 97.0 fL Final   • MCH 09/22/2022 30.6  26.6 - 33.0 pg Final   • MCHC 09/22/2022 34.0  31.5 - 35.7 g/dL Final   • RDW 09/22/2022 13.3  12.3 - 15.4 % Final   • RDW-SD 09/22/2022 43.3  37.0 - 54.0 fl Final   • MPV 09/22/2022 12.5 (H)  6.0 - 12.0 fL Final   • Platelets 09/22/2022 196  140 - 450 10*3/mm3 Final   • Neutrophil % 09/22/2022 74.4  42.7 - 76.0 % Final   • Lymphocyte % 09/22/2022 16.5 (L)  19.6 - 45.3 % Final   • Monocyte % 09/22/2022 4.5 (L)  5.0 - 12.0 % Final   • Eosinophil % 09/22/2022 3.8  0.3 - 6.2 % Final   • Basophil % 09/22/2022 0.3  0.0 - 1.5 % Final   • Immature Grans % 09/22/2022 0.5  0.0 - 0.5 % Final   • Neutrophils, Absolute 09/22/2022 9.50 (H)  1.70 - 7.00 10*3/mm3 Final   • Lymphocytes, Absolute 09/22/2022 2.11  0.70 - 3.10 10*3/mm3 Final   • Monocytes, Absolute 09/22/2022 0.58  0.10 - 0.90 10*3/mm3 Final   • Eosinophils, Absolute 09/22/2022 0.48 (H)  0.00 - 0.40 10*3/mm3 Final   • Basophils, Absolute 09/22/2022 0.04  0.00 - 0.20 10*3/mm3 Final   • Immature Grans, Absolute 09/22/2022 0.06 (H)  0.00 - 0.05 10*3/mm3 Final   • nRBC 09/22/2022 0.0  0.0  - 0.2 /100 WBC Final        Procedure: None  Procedures     I have reviewed and agree with the above PMH, PSH, FMH, social history, medications, allergies, and labs.     Assessment/Plan:   Problem List Items Addressed This Visit    None  Visit Diagnoses     Frequency of urination    -  Primary    Relevant Orders    POC Urinalysis Dipstick, Automated (Completed)    Urine Culture - Urine, Urine, Clean Catch    Left flank pain        Relevant Orders    CT Abdomen Pelvis Stone Protocol          Health Maintenance:   Health Maintenance Due   Topic Date Due   • COVID-19 Vaccine (1) Never done   • HPV VACCINES (1 - 2-dose series) Never done   • TDAP/TD VACCINES (1 - Tdap) Never done   • PAP SMEAR  Never done   • ANNUAL PHYSICAL  04/19/2020   • INFLUENZA VACCINE  08/01/2022        Smoking Counseling: She has never smoked or used smokeless tobacco.    Urine Incontinence: Patient reports that she is not currently experiencing any symptoms of urinary incontinence.    Patient was given instructions and counseling regarding her condition or for health maintenance advice. Please see specific information pulled into the AVS if appropriate.    Patient Education:   Left flank pain -since patient has a medical history significant for left renal stone I will order a CT scan to be completed in the outpatient setting.  I will also culture the patient's urine for signs of infection.  I discussed with the patient ways to help prevent urinary tract infections.  I discussed the use of a daily probiotic over-the-counter.  I discussed the need to increase water intake to 2 to 3 L/day.  Educated patient about the use of Flomax once daily to help with passage of renal stones.  Patient states she would like to have CT scan completed first.  Advised patient to return to clinic or go to the nearest ER if she begins to experience fever, chills, nausea, vomiting, increase in pain, gross hematuria, difficulty urinating, or altered mental status.   Otherwise, I will call patient with CT results and we will discuss further plan of care then.  Patient verbalizes understanding and agrees to plan of care.    Visit Diagnoses:    ICD-10-CM ICD-9-CM   1. Frequency of urination  R35.0 788.41   2. Left flank pain  R10.9 789.09       Meds Ordered During Visit:  No orders of the defined types were placed in this encounter.      Follow Up Appointment: Pending CT report  No follow-ups on file.      This document has been electronically signed by Bassam Carvajal PA-C   October 28, 2022 16:00 EDT    Part of this note may be an electronic transcription/translation of spoken language to printed text using the Dragon Dictation System.

## 2022-11-01 LAB
BACTERIA UR CULT: ABNORMAL

## 2022-11-11 ENCOUNTER — APPOINTMENT (OUTPATIENT)
Dept: CT IMAGING | Facility: HOSPITAL | Age: 27
End: 2022-11-11

## 2022-11-21 ENCOUNTER — OFFICE VISIT (OUTPATIENT)
Dept: UROLOGY | Facility: CLINIC | Age: 27
End: 2022-11-21

## 2022-11-21 VITALS — BODY MASS INDEX: 25.77 KG/M2 | HEIGHT: 70 IN | WEIGHT: 180 LBS

## 2022-11-21 DIAGNOSIS — N20.0 BILATERAL KIDNEY STONES: ICD-10-CM

## 2022-11-21 DIAGNOSIS — R35.0 FREQUENCY OF URINATION: Primary | ICD-10-CM

## 2022-11-21 LAB
BILIRUB BLD-MCNC: NEGATIVE MG/DL
CLARITY, POC: CLEAR
COLOR UR: YELLOW
EXPIRATION DATE: NORMAL
GLUCOSE UR STRIP-MCNC: NEGATIVE MG/DL
KETONES UR QL: NEGATIVE
LEUKOCYTE EST, POC: NEGATIVE
Lab: NORMAL
NITRITE UR-MCNC: NEGATIVE MG/ML
PH UR: 6 [PH] (ref 5–8)
PROT UR STRIP-MCNC: NEGATIVE MG/DL
RBC # UR STRIP: NEGATIVE /UL
SP GR UR: 1.02 (ref 1–1.03)
UROBILINOGEN UR QL: NORMAL

## 2022-11-21 PROCEDURE — 81003 URINALYSIS AUTO W/O SCOPE: CPT

## 2022-11-21 PROCEDURE — 99213 OFFICE O/P EST LOW 20 MIN: CPT

## 2022-11-21 NOTE — PROGRESS NOTES
"Chief Complaint:    Chief Complaint   Patient presents with   • Urinary Frequency       Vital Signs:   Ht 177.8 cm (70\")   Wt 81.6 kg (180 lb)   BMI 25.83 kg/m²   Body mass index is 25.83 kg/m².      HPI:  Sergo Solis is a 27 y.o. female who presents today for follow up    History of Present Illness  Mrs. Solis presents to the clinic for a 1 month follow-up for UTI and nephrolithiasis.  At her last visit she had a urine culture that came back positive for group B strep.  She states that she had it repeated by her OB/GYN and that showed less than 10,000 forming units.  She was never started on antibiotics.  She had a CT scan completed that showed two 3 mm renal calculus with one in the mid right kidney and the other in the lower left kidney.  States that she does not have any complaints or symptoms at this time.  She is doing well.      Past Medical History:  Past Medical History:   Diagnosis Date   • Blood in urine    • Raynaud disease        Current Meds:  Current Outpatient Medications   Medication Sig Dispense Refill   • dicloxacillin (DYNAPEN) 500 MG capsule TAKE 1 CAPSULE BY MOUTH EVERY 6 HOURS 1 HOUR BEFORE A MEAL OR 2 HOURS AFTER A MEAL     • Prenatal Vit-Fe Fumarate-FA (prenatal vitamin 27-0.8) 27-0.8 MG tablet tablet Take 1 tablet by mouth Daily.       No current facility-administered medications for this visit.        Allergies:   Allergies   Allergen Reactions   • Azithromycin Hives   • Pork-Derived Products Unknown - Low Severity   • Shellfish-Derived Products Unknown - High Severity        Past Surgical History:  Past Surgical History:   Procedure Laterality Date   • WISDOM TOOTH EXTRACTION Bilateral        Social History:  Social History     Socioeconomic History   • Marital status:    Tobacco Use   • Smoking status: Never   • Smokeless tobacco: Never   Substance and Sexual Activity   • Alcohol use: No   • Drug use: No   • Sexual activity: Defer       Family History:  Family " History   Problem Relation Age of Onset   • Hypertension Mother    • No Known Problems Father    • Hypertension Maternal Grandmother    • Thyroid disease Maternal Grandmother    • Hyperlipidemia Maternal Grandmother    • Hypertension Maternal Grandfather    • Cancer Maternal Grandfather    • Diabetes Paternal Grandmother    • Hypertension Paternal Grandmother    • Thyroid disease Paternal Grandmother    • Parkinsonism Paternal Grandmother    • Hypertension Paternal Grandfather        Review of Systems:  Review of Systems   Constitutional: Positive for fatigue. Negative for fever and unexpected weight change.   HENT: Negative for sinus pressure and sinus pain.    Eyes: Negative for pain and redness.   Respiratory: Negative for chest tightness and shortness of breath.    Cardiovascular: Negative for chest pain.   Gastrointestinal: Positive for constipation. Negative for abdominal pain, diarrhea, nausea and vomiting.   Endocrine: Negative for heat intolerance.   Genitourinary: Negative for difficulty urinating, dysuria, flank pain, frequency, hematuria and urgency.   Musculoskeletal: Negative for back pain.   Skin: Negative for rash.   Allergic/Immunologic: Negative for food allergies.   Neurological: Negative for headaches.   Hematological: Does not bruise/bleed easily.   Psychiatric/Behavioral: Negative for confusion and suicidal ideas. The patient is not nervous/anxious.        Physical Exam:  Physical Exam  Constitutional:       General: She is not in acute distress.     Appearance: Normal appearance.   HENT:      Head: Normocephalic and atraumatic.      Nose: Nose normal.      Mouth/Throat:      Mouth: Mucous membranes are moist.   Eyes:      Conjunctiva/sclera: Conjunctivae normal.   Cardiovascular:      Rate and Rhythm: Normal rate and regular rhythm.      Pulses: Normal pulses.      Heart sounds: Normal heart sounds.   Pulmonary:      Effort: Pulmonary effort is normal.      Breath sounds: Normal breath sounds.    Abdominal:      General: Bowel sounds are normal.      Palpations: Abdomen is soft.   Musculoskeletal:         General: Normal range of motion.      Cervical back: Normal range of motion.   Skin:     General: Skin is warm.   Neurological:      General: No focal deficit present.      Mental Status: She is alert and oriented to person, place, and time.   Psychiatric:         Mood and Affect: Mood normal.         Behavior: Behavior normal.         Thought Content: Thought content normal.         Judgment: Judgment normal.       Recent Image (CT and/or KUB):   CT Abdomen and Pelvis: No results found for this or any previous visit.     CT Stone Protocol: No results found for this or any previous visit.     KUB: No results found for this or any previous visit.       Labs:  Brief Urine Lab Results  (Last result in the past 365 days)      Color   Clarity   Blood   Leuk Est   Nitrite   Protein   CREAT   Urine HCG        11/21/22 1121 Yellow   Clear   Negative   Negative   Negative   Negative               Office Visit on 11/21/2022   Component Date Value Ref Range Status   • Color 11/21/2022 Yellow  Yellow, Straw, Dark Yellow, Sally Final   • Clarity, UA 11/21/2022 Clear  Clear Final   • Specific Gravity  11/21/2022 1.020  1.005 - 1.030 Final   • pH, Urine 11/21/2022 6.0  5.0 - 8.0 Final   • Leukocytes 11/21/2022 Negative  Negative Final   • Nitrite, UA 11/21/2022 Negative  Negative Final   • Protein, POC 11/21/2022 Negative  Negative mg/dL Final   • Glucose, UA 11/21/2022 Negative  Negative mg/dL Final   • Ketones, UA 11/21/2022 Negative  Negative Final   • Urobilinogen, UA 11/21/2022 Normal  Normal, 0.2 E.U./dL Final   • Bilirubin 11/21/2022 Negative  Negative Final   • Blood, UA 11/21/2022 Negative  Negative Final   • Lot Number 11/21/2022 [id number]   Final   • Expiration Date 11/21/2022 2/8/24   Final   Office Visit on 10/28/2022   Component Date Value Ref Range Status   • Color 10/28/2022 Yellow  Yellow, Straw,  Dark Yellow, Sally Final   • Clarity, UA 10/28/2022 Clear  Clear Final   • Specific Gravity  10/28/2022 1.015  1.005 - 1.030 Final   • pH, Urine 10/28/2022 6.5  5.0 - 8.0 Final   • Leukocytes 10/28/2022 Moderate (2+) (A)  Negative Final   • Nitrite, UA 10/28/2022 Negative  Negative Final   • Protein, POC 10/28/2022 Negative  Negative mg/dL Final   • Glucose, UA 10/28/2022 Negative  Negative mg/dL Final   • Ketones, UA 10/28/2022 Negative  Negative Final   • Urobilinogen, UA 10/28/2022 Normal  Normal, 0.2 E.U./dL Final   • Bilirubin 10/28/2022 Negative  Negative Final   • Blood, UA 10/28/2022 Negative  Negative Final   • Lot Number 10/28/2022 [id number]   Final   • Expiration Date 10/28/2022 2,082,024   Final   • Urine Culture 10/28/2022 Final report (A)   Final   • Result 1 10/28/2022 Comment (A)   Final    Comment: Beta hemolytic Streptococcus, group B  50,000-100,000 colony forming units per mL  Penicillin and ampicillin are drugs of choice for treatment of  beta-hemolytic streptococcal infections. Susceptibility testing of  penicillins and other beta-lactam agents approved by the FDA for  treatment of beta-hemolytic streptococcal infections need not be  performed routinely because nonsusceptible isolates are extremely  rare in any beta-hemolytic streptococcus and have not been reported  for Streptococcus pyogenes (group A). (CLSI)     • Result 2 10/28/2022 Comment   Final    Comment: Mixed urogenital zamzam  50,000-100,000 colony forming units per mL     Admission on 09/19/2022, Discharged on 09/22/2022   Component Date Value Ref Range Status   • THC, Screen, Urine 09/19/2022 Negative  Negative Final   • Phencyclidine (PCP), Urine 09/19/2022 Negative  Negative Final   • Cocaine Screen, Urine 09/19/2022 Negative  Negative Final   • Methamphetamine, Ur 09/19/2022 Negative  Negative Final   • Opiate Screen 09/19/2022 Negative  Negative Final   • Amphetamine Screen, Urine 09/19/2022 Negative  Negative Final   •  Benzodiazepine Screen, Urine 09/19/2022 Negative  Negative Final   • Tricyclic Antidepressants Screen 09/19/2022 Negative  Negative Final   • Methadone Screen, Urine 09/19/2022 Negative  Negative Final   • Barbiturates Screen, Urine 09/19/2022 Negative  Negative Final   • Oxycodone Screen, Urine 09/19/2022 Negative  Negative Final   • Propoxyphene Screen 09/19/2022 Negative  Negative Final   • Buprenorphine, Screen, Urine 09/19/2022 Negative  Negative Final   • ABO Type 09/19/2022 B   Final   • RH type 09/19/2022 Positive   Final   • Antibody Screen 09/19/2022 Negative   Final   • T&S Expiration Date 09/19/2022 9/22/2022 11:59:59 PM   Final   • WBC 09/19/2022 13.26 (H)  3.40 - 10.80 10*3/mm3 Final   • RBC 09/19/2022 3.89  3.77 - 5.28 10*6/mm3 Final   • Hemoglobin 09/19/2022 11.8 (L)  12.0 - 15.9 g/dL Final   • Hematocrit 09/19/2022 34.3  34.0 - 46.6 % Final   • MCV 09/19/2022 88.2  79.0 - 97.0 fL Final   • MCH 09/19/2022 30.3  26.6 - 33.0 pg Final   • MCHC 09/19/2022 34.4  31.5 - 35.7 g/dL Final   • RDW 09/19/2022 12.9  12.3 - 15.4 % Final   • RDW-SD 09/19/2022 41.3  37.0 - 54.0 fl Final   • MPV 09/19/2022 12.9 (H)  6.0 - 12.0 fL Final   • Platelets 09/19/2022 203  140 - 450 10*3/mm3 Final   • Neutrophil % 09/19/2022 72.5  42.7 - 76.0 % Final   • Lymphocyte % 09/19/2022 17.0 (L)  19.6 - 45.3 % Final   • Monocyte % 09/19/2022 8.5  5.0 - 12.0 % Final   • Eosinophil % 09/19/2022 1.2  0.3 - 6.2 % Final   • Basophil % 09/19/2022 0.2  0.0 - 1.5 % Final   • Immature Grans % 09/19/2022 0.6 (H)  0.0 - 0.5 % Final   • Neutrophils, Absolute 09/19/2022 9.62 (H)  1.70 - 7.00 10*3/mm3 Final   • Lymphocytes, Absolute 09/19/2022 2.25  0.70 - 3.10 10*3/mm3 Final   • Monocytes, Absolute 09/19/2022 1.13 (H)  0.10 - 0.90 10*3/mm3 Final   • Eosinophils, Absolute 09/19/2022 0.16  0.00 - 0.40 10*3/mm3 Final   • Basophils, Absolute 09/19/2022 0.02  0.00 - 0.20 10*3/mm3 Final   • Immature Grans, Absolute 09/19/2022 0.08 (H)  0.00 - 0.05  10*3/mm3 Final   • nRBC 09/19/2022 0.0  0.0 - 0.2 /100 WBC Final   • External Strep Group B Ag 03/28/2022 Positive   Final   • External Antibody Screen 03/28/2022 Negative   Final   • External ABO Grouping 03/28/2022 B   Final   • External Rh Factor 03/28/2022 Positive   Final   • External Hepatitis B Surface Ag 03/28/2022 Negative   Final   • External RPR 03/28/2022 Non-Reactive   Final   • External Rubella Qual 03/28/2022 Immune   Final   • External HIV Antibody 03/28/2022 Negative   Final   • ABO Type 09/19/2022 B   Final   • RH type 09/19/2022 Positive   Final   • WBC 09/20/2022 21.14 (H)  3.40 - 10.80 10*3/mm3 Final   • RBC 09/20/2022 3.81  3.77 - 5.28 10*6/mm3 Final   • Hemoglobin 09/20/2022 11.7 (L)  12.0 - 15.9 g/dL Final   • Hematocrit 09/20/2022 34.1  34.0 - 46.6 % Final   • MCV 09/20/2022 89.5  79.0 - 97.0 fL Final   • MCH 09/20/2022 30.7  26.6 - 33.0 pg Final   • MCHC 09/20/2022 34.3  31.5 - 35.7 g/dL Final   • RDW 09/20/2022 12.7  12.3 - 15.4 % Final   • RDW-SD 09/20/2022 41.5  37.0 - 54.0 fl Final   • MPV 09/20/2022 12.8 (H)  6.0 - 12.0 fL Final   • Platelets 09/20/2022 202  140 - 450 10*3/mm3 Final   • Neutrophil % 09/20/2022 78.8 (H)  42.7 - 76.0 % Final   • Lymphocyte % 09/20/2022 11.7 (L)  19.6 - 45.3 % Final   • Monocyte % 09/20/2022 8.5  5.0 - 12.0 % Final   • Eosinophil % 09/20/2022 0.4  0.3 - 6.2 % Final   • Basophil % 09/20/2022 0.1  0.0 - 1.5 % Final   • Immature Grans % 09/20/2022 0.5  0.0 - 0.5 % Final   • Neutrophils, Absolute 09/20/2022 16.66 (H)  1.70 - 7.00 10*3/mm3 Final   • Lymphocytes, Absolute 09/20/2022 2.48  0.70 - 3.10 10*3/mm3 Final   • Monocytes, Absolute 09/20/2022 1.79 (H)  0.10 - 0.90 10*3/mm3 Final   • Eosinophils, Absolute 09/20/2022 0.08  0.00 - 0.40 10*3/mm3 Final   • Basophils, Absolute 09/20/2022 0.03  0.00 - 0.20 10*3/mm3 Final   • Immature Grans, Absolute 09/20/2022 0.10 (H)  0.00 - 0.05 10*3/mm3 Final   • nRBC 09/20/2022 0.0  0.0 - 0.2 /100 WBC Final   • WBC  09/22/2022 12.77 (H)  3.40 - 10.80 10*3/mm3 Final   • RBC 09/22/2022 3.86  3.77 - 5.28 10*6/mm3 Final   • Hemoglobin 09/22/2022 11.8 (L)  12.0 - 15.9 g/dL Final   • Hematocrit 09/22/2022 34.7  34.0 - 46.6 % Final   • MCV 09/22/2022 89.9  79.0 - 97.0 fL Final   • MCH 09/22/2022 30.6  26.6 - 33.0 pg Final   • MCHC 09/22/2022 34.0  31.5 - 35.7 g/dL Final   • RDW 09/22/2022 13.3  12.3 - 15.4 % Final   • RDW-SD 09/22/2022 43.3  37.0 - 54.0 fl Final   • MPV 09/22/2022 12.5 (H)  6.0 - 12.0 fL Final   • Platelets 09/22/2022 196  140 - 450 10*3/mm3 Final   • Neutrophil % 09/22/2022 74.4  42.7 - 76.0 % Final   • Lymphocyte % 09/22/2022 16.5 (L)  19.6 - 45.3 % Final   • Monocyte % 09/22/2022 4.5 (L)  5.0 - 12.0 % Final   • Eosinophil % 09/22/2022 3.8  0.3 - 6.2 % Final   • Basophil % 09/22/2022 0.3  0.0 - 1.5 % Final   • Immature Grans % 09/22/2022 0.5  0.0 - 0.5 % Final   • Neutrophils, Absolute 09/22/2022 9.50 (H)  1.70 - 7.00 10*3/mm3 Final   • Lymphocytes, Absolute 09/22/2022 2.11  0.70 - 3.10 10*3/mm3 Final   • Monocytes, Absolute 09/22/2022 0.58  0.10 - 0.90 10*3/mm3 Final   • Eosinophils, Absolute 09/22/2022 0.48 (H)  0.00 - 0.40 10*3/mm3 Final   • Basophils, Absolute 09/22/2022 0.04  0.00 - 0.20 10*3/mm3 Final   • Immature Grans, Absolute 09/22/2022 0.06 (H)  0.00 - 0.05 10*3/mm3 Final   • nRBC 09/22/2022 0.0  0.0 - 0.2 /100 WBC Final        Procedure: None  Procedures     I have reviewed and agree with the above PMH, PSH, FMH, social history, medications, allergies, and labs.     Assessment/Plan:   Problem List Items Addressed This Visit    None  Visit Diagnoses     Frequency of urination    -  Primary    Bilateral kidney stones              Health Maintenance:   Health Maintenance Due   Topic Date Due   • COVID-19 Vaccine (1) Never done   • HPV VACCINES (1 - 2-dose series) Never done   • TDAP/TD VACCINES (1 - Tdap) Never done   • PAP SMEAR  Never done   • ANNUAL PHYSICAL  04/19/2020   • INFLUENZA VACCINE  08/01/2022         Smoking Counseling: She has never smoked or use smokeless tobacco    Urine Incontinence: Patient reports that she is not currently experiencing any symptoms of urinary incontinence.    Patient was given instructions and counseling regarding her condition or for health maintenance advice. Please see specific information pulled into the AVS if appropriate.    Patient Education:   Bilateral kidney stones -discussed with the patient her most recent CT results revealing small 3 mm bilateral intrarenal calculi.  However educated the patient that she has roughly 80% chance of passing a stone less than 5 mm.  I discussed the use of Flomax as indicated when passing the stone.  Patient has no back pain or urinary symptoms at this time.  Advised patient to return to the clinic or the nearest ER if she has fever, chills, nausea, vomiting, pain uncontrolled with medication, gross hematuria that does not stop, or altered mental status.  Urinary tract infection -patient states that she is feeling much better and has no complaints at this time.  Urine culture obtained by her OB/GYN showed less forming units the urine culture obtained in office at last visit.  Advised patient to continue to increase water intake to 2 to 3 L/day and take a daily probiotic.  I will see her back on an as-needed basis.  Patient verbalizes understanding and agrees to plan of care.    Visit Diagnoses:    ICD-10-CM ICD-9-CM   1. Frequency of urination  R35.0 788.41   2. Bilateral kidney stones  N20.0 592.0       Meds Ordered During Visit:  No orders of the defined types were placed in this encounter.      Follow Up Appointment: As needed  No follow-ups on file.      This document has been electronically signed by Bassam Carvajal PA-C   November 21, 2022 12:23 EST    Part of this note may be an electronic transcription/translation of spoken language to printed text using the Dragon Dictation System.

## 2023-01-30 ENCOUNTER — TRANSCRIBE ORDERS (OUTPATIENT)
Dept: ADMINISTRATIVE | Facility: HOSPITAL | Age: 28
End: 2023-01-30
Payer: COMMERCIAL

## 2023-01-30 ENCOUNTER — HOSPITAL ENCOUNTER (OUTPATIENT)
Dept: RESPIRATORY THERAPY | Facility: HOSPITAL | Age: 28
Discharge: HOME | End: 2023-01-30
Admitting: NURSE PRACTITIONER
Payer: COMMERCIAL

## 2023-01-30 DIAGNOSIS — R00.2 PALPITATIONS: ICD-10-CM

## 2023-01-30 DIAGNOSIS — R00.2 PALPITATIONS: Primary | ICD-10-CM

## 2023-01-30 PROCEDURE — 93226 XTRNL ECG REC<48 HR SCAN A/R: CPT

## 2023-01-30 PROCEDURE — 93225 XTRNL ECG REC<48 HRS REC: CPT

## 2023-02-23 ENCOUNTER — TRANSCRIBE ORDERS (OUTPATIENT)
Dept: ADMINISTRATIVE | Facility: HOSPITAL | Age: 28
End: 2023-02-23
Payer: COMMERCIAL

## 2023-02-23 DIAGNOSIS — R00.2 HEART PALPITATIONS: Primary | ICD-10-CM

## 2023-03-21 ENCOUNTER — OFFICE VISIT (OUTPATIENT)
Dept: CARDIOLOGY | Facility: CLINIC | Age: 28
End: 2023-03-21

## 2023-03-21 VITALS
WEIGHT: 184.8 LBS | RESPIRATION RATE: 16 BRPM | OXYGEN SATURATION: 98 % | DIASTOLIC BLOOD PRESSURE: 68 MMHG | SYSTOLIC BLOOD PRESSURE: 112 MMHG | HEART RATE: 79 BPM | HEIGHT: 70 IN | BODY MASS INDEX: 26.45 KG/M2

## 2023-03-21 DIAGNOSIS — R00.2 PALPITATIONS: Primary | ICD-10-CM

## 2023-03-21 PROCEDURE — 99203 OFFICE O/P NEW LOW 30 MIN: CPT | Performed by: INTERNAL MEDICINE

## 2023-03-21 PROCEDURE — 93000 ELECTROCARDIOGRAM COMPLETE: CPT | Performed by: INTERNAL MEDICINE

## 2023-03-21 RX ORDER — ERGOCALCIFEROL (VITAMIN D2) 10 MCG
2000 TABLET ORAL DAILY
COMMUNITY

## 2023-03-21 NOTE — PROGRESS NOTES
Frank Carreon MD  Sergo Solis  1995 03/21/2023    Patient Active Problem List   Diagnosis   • Raynaud's phenomenon without gangrene   • Gastroesophageal reflux disease without esophagitis   • Family planning   • NST (non-stress test) nonreactive   • Normal labor   • Postpartum care following vaginal delivery       Dear Frank Carreon MD:    Subjective     Sergo Solis is a 27 y.o. female with the problems as listed above, presents    Chief complaint: Recent palpitations.    History of Present Illness: Sergo is a very pleasant 27-year-old  female with complains of palpitations recently for which she had a Holter monitor done.  She is here to see if she would need any further cardiac evaluation.  On further questioning she indicates that she has had this palpitations this in the last few months since had a baby about 6 months ago.  These apparently have gotten better recently.  There is no associated dizziness or syncope.  Her Holter monitor revealed predominantly sinus rhythm with only rare PACs and PVCs noted with rare couplets and 1 triplet.  There were no episodes of SVT or V. tach.  Patient reported events correlated apparently with sinus tachycardia at a rate of about 118 bpm.  There was no association of events with any arrhythmias.  She denies any complaints of chest pains or shortness of breath.  She denies consuming caffeinated beverages.  She states that she has had thyroid profile checked and was told they were good.            Allergies   Allergen Reactions   • Azithromycin Hives   • Pork-Derived Products Unknown - Low Severity   • Shellfish-Derived Products Unknown - High Severity   :    Current Outpatient Medications:   •  Vitamin D, Cholecalciferol, (CHOLECALCIFEROL) 10 MCG (400 UNIT) tablet, Take 5 tablets by mouth Daily., Disp: , Rfl:     Past Medical History:   Diagnosis Date   • Blood in urine    • Hypertension    • Migraines    • Raynaud disease      Past  "Surgical History:   Procedure Laterality Date   • WISDOM TOOTH EXTRACTION Bilateral      Family History   Problem Relation Age of Onset   • Hypertension Mother    • No Known Problems Father    • Arrhythmia Maternal Grandmother    • Hypertension Maternal Grandmother    • Thyroid disease Maternal Grandmother    • Hyperlipidemia Maternal Grandmother    • Hypertension Maternal Grandfather    • Cancer Maternal Grandfather    • Diabetes Paternal Grandmother    • Hypertension Paternal Grandmother    • Thyroid disease Paternal Grandmother    • Parkinsonism Paternal Grandmother    • Hypertension Paternal Grandfather      Social History     Tobacco Use   • Smoking status: Never   • Smokeless tobacco: Never   Substance Use Topics   • Alcohol use: No   • Drug use: No     Review of Systems   Constitutional: Negative for chills, fever, malaise/fatigue, weight gain and weight loss.   HENT: Negative for congestion and nosebleeds.    Cardiovascular: Negative for chest pain, irregular heartbeat, leg swelling and orthopnea.   Respiratory: Negative for cough, hemoptysis and shortness of breath.    Gastrointestinal: Negative for abdominal pain, change in bowel habit, hematemesis, melena and vomiting.   Genitourinary: Negative for dysuria, frequency and hematuria.   Neurological: Negative for focal weakness, headaches, light-headedness and weakness.     Objective   Blood pressure 112/68, pulse 79, resp. rate 16, height 177.8 cm (70\"), weight 83.8 kg (184 lb 12.8 oz), SpO2 98 %, currently breastfeeding.  Body mass index is 26.52 kg/m².    Vitals reviewed.   Constitutional:       Appearance: Well-developed.   Eyes:      Conjunctiva/sclera: Conjunctivae normal.   HENT:      Head: Normocephalic.   Neck:      Thyroid: No thyromegaly.      Vascular: No JVD.      Trachea: No tracheal deviation.   Pulmonary:      Effort: No respiratory distress.      Breath sounds: Normal breath sounds. No wheezing. No rales.   Cardiovascular:      PMI at left " midclavicular line. Normal rate. Regular rhythm. Normal S1. Normal S2.      Murmurs: There is no murmur.      No gallop. No click. No rub.   Pulses:     Intact distal pulses.   Edema:     Peripheral edema absent.   Abdominal:      General: Bowel sounds are normal.      Palpations: Abdomen is soft. There is no abdominal mass.      Tenderness: There is no abdominal tenderness.   Musculoskeletal:      Cervical back: Normal range of motion and neck supple. Skin:     General: Skin is warm and dry.   Neurological:      Mental Status: Alert and oriented to person, place, and time.      Cranial Nerves: No cranial nerve deficit.         ECG 12 Lead    Date/Time: 3/24/2023 12:35 PM  Performed by: Stephon Barker MD  Authorized by: Stephon Barker MD   Previous ECG: no previous ECG available  Rhythm: sinus rhythm  Conduction: conduction normal  ST Segments: ST segments normal  T Waves: T waves normal    Clinical impression: normal ECG            Assessment & Plan :   Diagnosis Plan    Palpitations with no significant cardiac arrhythmias documented on recent Holter monitor.            Recommendations:  I have also reviewed the Holter findings with the patient and reassured her that she does not seem to have any significant cardiac arrhythmias that I would be concerned about at this time.    Return in about 6 months (around 9/21/2023).    As always, Frank Carreon MD  I appreciate very much the opportunity to participate in the cardiovascular care of your patients. Please do not hesitate to call me with any questions with regards to Sergo Solis evaluation and management.       With Best Regards,        Stephon Barker MD, [facility]    Please note that portions of this note were completed with a voice recognition program.

## 2023-03-24 ENCOUNTER — PATIENT ROUNDING (BHMG ONLY) (OUTPATIENT)
Dept: CARDIOLOGY | Facility: CLINIC | Age: 28
End: 2023-03-24
Payer: COMMERCIAL

## 2023-03-24 NOTE — PROGRESS NOTES
March 24, 2023    Hello, may I speak with Sergo Solis?    My name is Catia     I am  with [facility]  [facility]  [address] [place] [address] [phone].    Before we get started may I verify your date of birth? 1995    I am calling to officially welcome you to our practice and ask about your recent visit. Is this a good time to talk? yes    Tell me about your visit with us. What things went well?  apt went good.       We're always looking for ways to make our patients' experiences even better. Do you have recommendations on ways we may improve?  yes    Overall were you satisfied with your first visit to our practice? yes       I appreciate you taking the time to speak with me today. Is there anything else I can do for you? no      Thank you, and have a great day.

## 2024-10-28 ENCOUNTER — OFFICE VISIT (OUTPATIENT)
Dept: UROLOGY | Facility: CLINIC | Age: 29
End: 2024-10-28
Payer: COMMERCIAL

## 2024-10-28 VITALS
HEIGHT: 70 IN | BODY MASS INDEX: 26.2 KG/M2 | DIASTOLIC BLOOD PRESSURE: 73 MMHG | HEART RATE: 70 BPM | WEIGHT: 183 LBS | SYSTOLIC BLOOD PRESSURE: 103 MMHG

## 2024-10-28 DIAGNOSIS — R10.9 FLANK PAIN: Primary | ICD-10-CM

## 2024-10-28 DIAGNOSIS — Q61.5 MEDULLARY SPONGE KIDNEY OF BOTH KIDNEYS: Primary | ICD-10-CM

## 2024-10-28 DIAGNOSIS — R35.0 FREQUENCY OF URINATION: ICD-10-CM

## 2024-10-28 LAB
BILIRUB BLD-MCNC: NEGATIVE MG/DL
CLARITY, POC: CLEAR
COLOR UR: YELLOW
EXPIRATION DATE: ABNORMAL
GLUCOSE UR STRIP-MCNC: NEGATIVE MG/DL
KETONES UR QL: NEGATIVE
LEUKOCYTE EST, POC: NEGATIVE
Lab: ABNORMAL
NITRITE UR-MCNC: NEGATIVE MG/ML
PH UR: 7 [PH] (ref 5–8)
PROT UR STRIP-MCNC: ABNORMAL MG/DL
RBC # UR STRIP: ABNORMAL /UL
SP GR UR: 1.01 (ref 1–1.03)
UROBILINOGEN UR QL: NORMAL

## 2024-10-28 PROCEDURE — 81003 URINALYSIS AUTO W/O SCOPE: CPT

## 2024-10-28 PROCEDURE — 99213 OFFICE O/P EST LOW 20 MIN: CPT

## 2024-10-28 PROCEDURE — 87086 URINE CULTURE/COLONY COUNT: CPT

## 2024-10-28 RX ORDER — TAMSULOSIN HYDROCHLORIDE 0.4 MG/1
1 CAPSULE ORAL AS NEEDED
Qty: 30 CAPSULE | Refills: 0 | Status: SHIPPED | OUTPATIENT
Start: 2024-10-28

## 2024-10-28 NOTE — PROGRESS NOTES
"Chief Complaint:    Chief Complaint   Patient presents with    Nephrolithiasis     Follow up     Flank Pain       Vital Signs:   /73 (BP Location: Right arm, Patient Position: Sitting, Cuff Size: Adult)   Pulse 70   Ht 177.8 cm (70\")   Wt 83 kg (183 lb)   BMI 26.26 kg/m²   Body mass index is 26.26 kg/m².      HPI:  Sergo Solis is a 29 y.o. female who presents today for follow up    History of Present Illness  Ms. Solis presents to the clinic for follow-up for nephrolithiasis.  She was last seen in office roughly 2 years ago and had a CT scan of the abdomen pelvis at that time that showed multiple punctate bilateral nonobstructing stones consistent with medullary sponge.  She has not been seen since but reports that she has had an increase in back and flank pain over the past month.  She did have recent CT scan completed at [facility] roughly 1 week ago that showed punctate nonobstructing kidney stones consistent with her previous medullary sponge.  There was no ureteral calculi or obstructive uropathy noted.  Both her and her partner have discussed trying to have a second child.  She had no significant complications during her first pregnancy.  She denies any gross hematuria or obvious passage of kidney stones.  Urine analysis shows trace amount of blood and protein only.  I did review the patient's CT imaging personally by disc and there was no concerns other than previous medullary sponge.      Past Medical History:  Past Medical History:   Diagnosis Date    Blood in urine     Hypertension     Kidney stones     Migraines     Raynaud disease        Current Meds:  Current Outpatient Medications   Medication Sig Dispense Refill    tamsulosin (FLOMAX) 0.4 MG capsule 24 hr capsule Take 1 capsule by mouth As Needed (kidney stones). 30 capsule 0     No current facility-administered medications for this visit.        Allergies:   Allergies   Allergen Reactions    Azithromycin Hives    " Pork-Derived Products Unknown - Low Severity    Shellfish-Derived Products Unknown - High Severity        Past Surgical History:  Past Surgical History:   Procedure Laterality Date    WISDOM TOOTH EXTRACTION Bilateral        Social History:  Social History     Socioeconomic History    Marital status:    Tobacco Use    Smoking status: Never    Smokeless tobacco: Never   Vaping Use    Vaping status: Never Used   Substance and Sexual Activity    Alcohol use: No    Drug use: No    Sexual activity: Defer       Family History:  Family History   Problem Relation Age of Onset    Hypertension Mother     No Known Problems Father     Arrhythmia Maternal Grandmother     Hypertension Maternal Grandmother     Thyroid disease Maternal Grandmother     Hyperlipidemia Maternal Grandmother     Hypertension Maternal Grandfather     Cancer Maternal Grandfather     Diabetes Paternal Grandmother     Hypertension Paternal Grandmother     Thyroid disease Paternal Grandmother     Parkinsonism Paternal Grandmother     Hypertension Paternal Grandfather        Review of Systems:  Review of Systems   Constitutional:  Positive for fatigue. Negative for chills, fever and unexpected weight change.   HENT:  Negative for congestion and sinus pressure.    Respiratory:  Negative for chest tightness and shortness of breath.    Cardiovascular:  Negative for chest pain.   Gastrointestinal:  Negative for abdominal pain, constipation, diarrhea, nausea and vomiting.   Genitourinary:  Positive for flank pain. Negative for difficulty urinating, dysuria, frequency, hematuria, pelvic pain, urgency and vaginal pain.   Musculoskeletal:  Positive for back pain. Negative for neck pain.   Skin:  Negative for rash.   Allergic/Immunologic: Negative for food allergies.   Neurological:  Negative for dizziness and headaches.   Hematological:  Does not bruise/bleed easily.   Psychiatric/Behavioral:  Negative for confusion and suicidal ideas. The patient is not  nervous/anxious.        Physical Exam:  Physical Exam  Constitutional:       General: She is not in acute distress.     Appearance: Normal appearance.   HENT:      Head: Normocephalic and atraumatic.      Nose: Nose normal.      Mouth/Throat:      Mouth: Mucous membranes are moist.   Eyes:      Conjunctiva/sclera: Conjunctivae normal.   Cardiovascular:      Rate and Rhythm: Normal rate and regular rhythm.      Pulses: Normal pulses.      Heart sounds: Normal heart sounds.   Pulmonary:      Effort: Pulmonary effort is normal.      Breath sounds: Normal breath sounds.   Abdominal:      General: Bowel sounds are normal.      Palpations: Abdomen is soft.   Musculoskeletal:         General: Normal range of motion.      Cervical back: Normal range of motion.   Skin:     General: Skin is warm.   Neurological:      General: No focal deficit present.      Mental Status: She is alert and oriented to person, place, and time.   Psychiatric:         Mood and Affect: Mood normal.         Behavior: Behavior normal.         Thought Content: Thought content normal.         Judgment: Judgment normal.            Recent Image (CT and/or KUB):   CT Abdomen and Pelvis: No results found for this or any previous visit.     CT Stone Protocol: No results found for this or any previous visit.     KUB: No results found for this or any previous visit.       Labs:  Brief Urine Lab Results  (Last result in the past 365 days)        Color   Clarity   Blood   Leuk Est   Nitrite   Protein   CREAT   Urine HCG        10/28/24 1050 Yellow   Clear   Trace   Negative   Negative   Trace                 Office Visit on 10/28/2024   Component Date Value Ref Range Status    Color 10/28/2024 Yellow  Yellow, Straw, Dark Yellow, Sally Final    Clarity, UA 10/28/2024 Clear  Clear Final    Specific Gravity  10/28/2024 1.015  1.005 - 1.030 Final    pH, Urine 10/28/2024 7.0  5.0 - 8.0 Final    Leukocytes 10/28/2024 Negative  Negative Final    Nitrite, UA 10/28/2024  Negative  Negative Final    Protein, POC 10/28/2024 Trace (A)  Negative mg/dL Final    Glucose, UA 10/28/2024 Negative  Negative mg/dL Final    Ketones, UA 10/28/2024 Negative  Negative Final    Urobilinogen, UA 10/28/2024 Normal  Normal, 0.2 E.U./dL Final    Bilirubin 10/28/2024 Negative  Negative Final    Blood, UA 10/28/2024 Trace (A)  Negative Final    Lot Number 10/28/2024 n   Final    Expiration Date 10/28/2024 n   Final        Procedure: None  Procedures     I have reviewed and agree with the above PMH, PSH, FMH, social history, medications, allergies, and labs.     Assessment/Plan:   Problem List Items Addressed This Visit       Medullary sponge kidney of both kidneys - Primary    Relevant Medications    tamsulosin (FLOMAX) 0.4 MG capsule 24 hr capsule     Other Visit Diagnoses       Frequency of urination        Relevant Orders    POC Urinalysis Dipstick, Automated (Completed)    Urine Culture - Urine, Urine, Random Void            Health Maintenance:   Health Maintenance Due   Topic Date Due    PAP SMEAR  Never done    ANNUAL PHYSICAL  04/18/2020    BMI FOLLOWUP  01/30/2021    INFLUENZA VACCINE  08/01/2024    COVID-19 Vaccine (1 - 2023-24 season) Never done        Smoking Counseling: Never smoked or used smokeless tobacco    Urine Incontinence: Patient reports that she is not currently experiencing any symptoms of urinary incontinence.    Patient was given instructions and counseling regarding her condition or for health maintenance advice. Please see specific information pulled into the AVS if appropriate.    Patient Education:   Medullary sponge kidney -discussed with the patient the pathophysiology of this condition in detail.  Discussed causes which can include environmental factors, medications, dietary changes, and genetics.  Discussed treatments which can include observation, conservative methods, medications, and surgical interventions.  Did discuss the use of extracorporal shockwave lithotripsy  however patient wishes to hold off on surgery at this time.  Did advise patient all of her stones are small and easily passable.  Did advise patient stones may increase with pregnancies.  I would recommend to take Flomax 0.4 milligrams as needed to help with passage of kidney stones.  Advised patient to discontinue medication if she becomes pregnant.  Educated increase water intake 2 to 3 L/day.  Did recommend a 6-month follow-up outpatient wishes to come back as needed.    Visit Diagnoses:    ICD-10-CM ICD-9-CM   1. Medullary sponge kidney of both kidneys  Q61.5 753.17   2. Frequency of urination  R35.0 788.41     A total of 20 minutes were spent coordinating this patient’s care in clinic today; 12 minutes of which were face-to-face with the patient, reviewing medical history and counseling on the current treatment and followup plan.  All questions were answered to patient's satisfaction.    Meds Ordered During Visit:  New Medications Ordered This Visit   Medications    tamsulosin (FLOMAX) 0.4 MG capsule 24 hr capsule     Sig: Take 1 capsule by mouth As Needed (kidney stones).     Dispense:  30 capsule     Refill:  0       Follow Up Appointment: As needed  No follow-ups on file.      This document has been electronically signed by Bassam Carvajal PA-C   October 28, 2024 12:24 EDT    Part of this note may be an electronic transcription/translation of spoken language to printed text using the Dragon Dictation System.

## 2024-10-29 LAB — BACTERIA SPEC AEROBE CULT: NORMAL

## 2024-10-30 ENCOUNTER — TELEPHONE (OUTPATIENT)
Dept: UROLOGY | Facility: CLINIC | Age: 29
End: 2024-10-30
Payer: COMMERCIAL

## 2024-10-30 NOTE — TELEPHONE ENCOUNTER
Called patient and advised her urine culture showed no growth.  Recommend to call back with any concerns.  She verbalized understanding.

## 2025-02-03 ENCOUNTER — ANESTHESIA EVENT (OUTPATIENT)
Dept: PERIOP | Facility: HOSPITAL | Age: 30
End: 2025-02-03
Payer: COMMERCIAL

## 2025-02-03 ENCOUNTER — ANESTHESIA (OUTPATIENT)
Dept: PERIOP | Facility: HOSPITAL | Age: 30
End: 2025-02-03
Payer: COMMERCIAL

## 2025-02-03 ENCOUNTER — HOSPITAL ENCOUNTER (OUTPATIENT)
Facility: HOSPITAL | Age: 30
Setting detail: HOSPITAL OUTPATIENT SURGERY
Discharge: HOME | End: 2025-02-03
Attending: OBSTETRICS & GYNECOLOGY | Admitting: OBSTETRICS & GYNECOLOGY
Payer: COMMERCIAL

## 2025-02-03 VITALS
OXYGEN SATURATION: 98 % | HEART RATE: 102 BPM | WEIGHT: 184.4 LBS | DIASTOLIC BLOOD PRESSURE: 72 MMHG | TEMPERATURE: 97.8 F | SYSTOLIC BLOOD PRESSURE: 128 MMHG | BODY MASS INDEX: 26.4 KG/M2 | HEIGHT: 70 IN | RESPIRATION RATE: 16 BRPM

## 2025-02-03 DIAGNOSIS — O02.1 MISSED ABORTION: ICD-10-CM

## 2025-02-03 PROCEDURE — 25010000002 KETOROLAC TROMETHAMINE PER 15 MG: Performed by: NURSE ANESTHETIST, CERTIFIED REGISTERED

## 2025-02-03 PROCEDURE — 25010000002 ONDANSETRON PER 1 MG: Performed by: NURSE ANESTHETIST, CERTIFIED REGISTERED

## 2025-02-03 PROCEDURE — 25010000002 MIDAZOLAM PER 1 MG: Performed by: NURSE ANESTHETIST, CERTIFIED REGISTERED

## 2025-02-03 PROCEDURE — 25010000002 LIDOCAINE PF 2% 2 % SOLUTION: Performed by: NURSE ANESTHETIST, CERTIFIED REGISTERED

## 2025-02-03 PROCEDURE — 25010000002 PROPOFOL 200 MG/20ML EMULSION: Performed by: NURSE ANESTHETIST, CERTIFIED REGISTERED

## 2025-02-03 PROCEDURE — 25010000002 FENTANYL CITRATE (PF) 50 MCG/ML SOLUTION: Performed by: NURSE ANESTHETIST, CERTIFIED REGISTERED

## 2025-02-03 PROCEDURE — 25010000002 GLYCOPYRROLATE 0.4 MG/2ML SOLUTION: Performed by: NURSE ANESTHETIST, CERTIFIED REGISTERED

## 2025-02-03 RX ORDER — SODIUM CHLORIDE 0.9 % (FLUSH) 0.9 %
10 SYRINGE (ML) INJECTION AS NEEDED
Status: DISCONTINUED | OUTPATIENT
Start: 2025-02-03 | End: 2025-02-03 | Stop reason: HOSPADM

## 2025-02-03 RX ORDER — OXYCODONE AND ACETAMINOPHEN 5; 325 MG/1; MG/1
1 TABLET ORAL ONCE AS NEEDED
Status: DISCONTINUED | OUTPATIENT
Start: 2025-02-03 | End: 2025-02-03 | Stop reason: HOSPADM

## 2025-02-03 RX ORDER — SODIUM CHLORIDE 9 MG/ML
40 INJECTION, SOLUTION INTRAVENOUS AS NEEDED
Status: DISCONTINUED | OUTPATIENT
Start: 2025-02-03 | End: 2025-02-03 | Stop reason: HOSPADM

## 2025-02-03 RX ORDER — PROPOFOL 10 MG/ML
INJECTION, EMULSION INTRAVENOUS AS NEEDED
Status: DISCONTINUED | OUTPATIENT
Start: 2025-02-03 | End: 2025-02-03 | Stop reason: SURG

## 2025-02-03 RX ORDER — FENTANYL CITRATE 50 UG/ML
INJECTION, SOLUTION INTRAMUSCULAR; INTRAVENOUS AS NEEDED
Status: DISCONTINUED | OUTPATIENT
Start: 2025-02-03 | End: 2025-02-03 | Stop reason: SURG

## 2025-02-03 RX ORDER — ONDANSETRON 2 MG/ML
4 INJECTION INTRAMUSCULAR; INTRAVENOUS AS NEEDED
Status: DISCONTINUED | OUTPATIENT
Start: 2025-02-03 | End: 2025-02-03 | Stop reason: HOSPADM

## 2025-02-03 RX ORDER — SODIUM CHLORIDE, SODIUM LACTATE, POTASSIUM CHLORIDE, CALCIUM CHLORIDE 600; 310; 30; 20 MG/100ML; MG/100ML; MG/100ML; MG/100ML
125 INJECTION, SOLUTION INTRAVENOUS ONCE
Status: DISCONTINUED | OUTPATIENT
Start: 2025-02-03 | End: 2025-02-03 | Stop reason: HOSPADM

## 2025-02-03 RX ORDER — MIDAZOLAM HYDROCHLORIDE 1 MG/ML
1 INJECTION, SOLUTION INTRAMUSCULAR; INTRAVENOUS
Status: DISCONTINUED | OUTPATIENT
Start: 2025-02-03 | End: 2025-02-03 | Stop reason: HOSPADM

## 2025-02-03 RX ORDER — MEPERIDINE HYDROCHLORIDE 25 MG/ML
12.5 INJECTION INTRAMUSCULAR; INTRAVENOUS; SUBCUTANEOUS
Status: DISCONTINUED | OUTPATIENT
Start: 2025-02-03 | End: 2025-02-03 | Stop reason: HOSPADM

## 2025-02-03 RX ORDER — SODIUM CHLORIDE 0.9 % (FLUSH) 0.9 %
10 SYRINGE (ML) INJECTION EVERY 12 HOURS SCHEDULED
Status: DISCONTINUED | OUTPATIENT
Start: 2025-02-03 | End: 2025-02-03 | Stop reason: HOSPADM

## 2025-02-03 RX ORDER — ONDANSETRON 2 MG/ML
INJECTION INTRAMUSCULAR; INTRAVENOUS AS NEEDED
Status: DISCONTINUED | OUTPATIENT
Start: 2025-02-03 | End: 2025-02-03 | Stop reason: SURG

## 2025-02-03 RX ORDER — LIDOCAINE HYDROCHLORIDE 20 MG/ML
INJECTION, SOLUTION EPIDURAL; INFILTRATION; INTRACAUDAL; PERINEURAL AS NEEDED
Status: DISCONTINUED | OUTPATIENT
Start: 2025-02-03 | End: 2025-02-03 | Stop reason: SURG

## 2025-02-03 RX ORDER — FAMOTIDINE 10 MG/ML
INJECTION, SOLUTION INTRAVENOUS AS NEEDED
Status: DISCONTINUED | OUTPATIENT
Start: 2025-02-03 | End: 2025-02-03 | Stop reason: SURG

## 2025-02-03 RX ORDER — IBUPROFEN 600 MG/1
600 TABLET, FILM COATED ORAL EVERY 6 HOURS PRN
Qty: 60 TABLET | Refills: 0 | Status: SHIPPED | OUTPATIENT
Start: 2025-02-03

## 2025-02-03 RX ORDER — IPRATROPIUM BROMIDE AND ALBUTEROL SULFATE 2.5; .5 MG/3ML; MG/3ML
3 SOLUTION RESPIRATORY (INHALATION) ONCE AS NEEDED
Status: DISCONTINUED | OUTPATIENT
Start: 2025-02-03 | End: 2025-02-03 | Stop reason: HOSPADM

## 2025-02-03 RX ORDER — MISOPROSTOL 100 UG/1
TABLET ORAL AS NEEDED
Status: DISCONTINUED | OUTPATIENT
Start: 2025-02-03 | End: 2025-02-03 | Stop reason: HOSPADM

## 2025-02-03 RX ORDER — MIDAZOLAM HYDROCHLORIDE 1 MG/ML
INJECTION, SOLUTION INTRAMUSCULAR; INTRAVENOUS AS NEEDED
Status: DISCONTINUED | OUTPATIENT
Start: 2025-02-03 | End: 2025-02-03 | Stop reason: SURG

## 2025-02-03 RX ORDER — FENTANYL CITRATE 50 UG/ML
50 INJECTION, SOLUTION INTRAMUSCULAR; INTRAVENOUS
Status: DISCONTINUED | OUTPATIENT
Start: 2025-02-03 | End: 2025-02-03 | Stop reason: HOSPADM

## 2025-02-03 RX ORDER — GLYCOPYRROLATE 0.2 MG/ML
INJECTION INTRAMUSCULAR; INTRAVENOUS AS NEEDED
Status: DISCONTINUED | OUTPATIENT
Start: 2025-02-03 | End: 2025-02-03 | Stop reason: SURG

## 2025-02-03 RX ORDER — KETOROLAC TROMETHAMINE 30 MG/ML
30 INJECTION, SOLUTION INTRAMUSCULAR; INTRAVENOUS EVERY 6 HOURS PRN
Status: COMPLETED | OUTPATIENT
Start: 2025-02-03 | End: 2025-02-03

## 2025-02-03 RX ADMIN — MIDAZOLAM HYDROCHLORIDE 2 MG: 1 INJECTION, SOLUTION INTRAMUSCULAR; INTRAVENOUS at 09:44

## 2025-02-03 RX ADMIN — GLYCOPYRROLATE 0.2 MG: 0.2 INJECTION INTRAMUSCULAR; INTRAVENOUS at 09:56

## 2025-02-03 RX ADMIN — FENTANYL CITRATE 50 MCG: 50 INJECTION INTRAMUSCULAR; INTRAVENOUS at 09:48

## 2025-02-03 RX ADMIN — FAMOTIDINE 20 MG: 10 INJECTION, SOLUTION INTRAVENOUS at 09:44

## 2025-02-03 RX ADMIN — LIDOCAINE HYDROCHLORIDE 60 MG: 20 INJECTION, SOLUTION EPIDURAL; INFILTRATION; INTRACAUDAL; PERINEURAL at 09:48

## 2025-02-03 RX ADMIN — ONDANSETRON 4 MG: 2 INJECTION INTRAMUSCULAR; INTRAVENOUS at 10:34

## 2025-02-03 RX ADMIN — ONDANSETRON 4 MG: 2 INJECTION INTRAMUSCULAR; INTRAVENOUS at 09:44

## 2025-02-03 RX ADMIN — FENTANYL CITRATE 50 MCG: 50 INJECTION INTRAMUSCULAR; INTRAVENOUS at 10:00

## 2025-02-03 RX ADMIN — PROPOFOL 150 MG: 10 INJECTION, EMULSION INTRAVENOUS at 09:48

## 2025-02-03 RX ADMIN — GLYCOPYRROLATE 0.2 MG: 0.2 INJECTION INTRAMUSCULAR; INTRAVENOUS at 09:54

## 2025-02-03 RX ADMIN — DOXYCYCLINE 100 MG: 100 INJECTION, POWDER, LYOPHILIZED, FOR SOLUTION INTRAVENOUS at 09:52

## 2025-02-03 RX ADMIN — KETOROLAC TROMETHAMINE 30 MG: 30 INJECTION, SOLUTION INTRAMUSCULAR; INTRAVENOUS at 10:34

## 2025-02-03 RX ADMIN — FENTANYL CITRATE 50 MCG: 50 INJECTION, SOLUTION INTRAMUSCULAR; INTRAVENOUS at 10:34

## 2025-02-03 NOTE — ANESTHESIA POSTPROCEDURE EVALUATION
Patient: Sergo Solis    Procedure Summary       Date: 02/03/25 Room / Location:  [facility]  /  [facility]    Anesthesia Start: 0944 Anesthesia Stop: 1017    Procedure: DILATATION AND CURETTAGE WITH SUCTION, I&D VAGINAL CYST (Vagina) Diagnosis: (O02.1-MISSED AB)    Surgeons: Christin Peralta DO Provider: Bishnu Ludwig MD    Anesthesia Type: general ASA Status: 1            Anesthesia Type: general    Vitals  Vitals Value Taken Time   /79 02/03/25 1040   Temp 97.8 °F (36.6 °C) 02/03/25 1020   Pulse 105 02/03/25 1045   Resp 16 02/03/25 1040   SpO2 97 % 02/03/25 1045   Vitals shown include unfiled device data.        Post Anesthesia Care and Evaluation    Patient location during evaluation: PHASE II  Patient participation: complete - patient participated  Level of consciousness: awake and alert  Pain score: 0  Pain management: adequate    Airway patency: patent  Anesthetic complications: No anesthetic complications    Cardiovascular status: acceptable  Respiratory status: acceptable  Hydration status: acceptable

## 2025-02-03 NOTE — ANESTHESIA PREPROCEDURE EVALUATION
Anesthesia Evaluation     Patient summary reviewed and Nursing notes reviewed   no history of anesthetic complications:   NPO Solid Status: > 8 hours  NPO Liquid Status: > 8 hours           Airway   Mallampati: I  TM distance: <3 FB  Neck ROM: full  no difficulty expected  Dental - normal exam     Pulmonary - negative pulmonary ROS and normal exam   Cardiovascular - normal exam  Exercise tolerance: excellent (>7 METS)    NYHA Classification: I    (+) hypertension      Neuro/Psych  (+) headaches  GI/Hepatic/Renal/Endo    (+) GERD, renal disease-    Musculoskeletal (-) negative ROS    Abdominal  - normal exam   Substance History - negative use     OB/GYN negative ob/gyn ROS         Other - negative ROS                         Anesthesia Plan    ASA 1     general       Anesthetic plan, risks, benefits, and alternatives have been provided, discussed and informed consent has been obtained with: patient.    Plan discussed with CRNA.        CODE STATUS:

## 2025-02-03 NOTE — OP NOTE
Suction D&C Operation Note    Sergo Solis  2/3/2025    Pre-op Diagnosis:   O02.1-MISSED AB    Post-op Diagnosis:     Same    Procedure(s):  DILATATION AND CURETTAGE WITH SUCTION     Surgeon(s):  Christin Peralta DO    Anesthesia: General    Staff:   Circulator: Chrystal Cobian RN  Scrub Person: Saúlvincent Rachael    Estimated Blood Loss: minimal    Specimens:                Order Name Source Comment Collection Info Order Time   TISSUE EXAM, [facility] ([facility]) Products of Conception  Collected By: Christin Peralta DO 2/3/2025 10:03 AM     Release to patient   Routine Release              Procedure     The patient was prepped and draped in normal sterile fashion. The cervix was gently dilated.  A number 8 suction curette was inserted and the uterine contents were evacuated. The patient tolerated the procedure and went to recovery room in stable condition.       Christin Peralta DO     Date: 2/3/2025  Time: 10:14 EST

## 2025-02-03 NOTE — H&P
Chief complaint Missed     History of Present Illness: Patient is a 29 y.o. female who presents for a suction dilation and curettage.       Past Medical History:   Diagnosis Date    Blood in urine     Kidney stones     Migraines     Miscarriage     Raynaud disease        Past Surgical History:   Procedure Laterality Date    WISDOM TOOTH EXTRACTION Bilateral        Family History   Problem Relation Age of Onset    Hypertension Mother     No Known Problems Father     Arrhythmia Maternal Grandmother     Hypertension Maternal Grandmother     Thyroid disease Maternal Grandmother     Hyperlipidemia Maternal Grandmother     Hypertension Maternal Grandfather     Cancer Maternal Grandfather     Diabetes Paternal Grandmother     Hypertension Paternal Grandmother     Thyroid disease Paternal Grandmother     Parkinsonism Paternal Grandmother     Hypertension Paternal Grandfather        Social History     Tobacco Use    Smoking status: Never    Smokeless tobacco: Never   Vaping Use    Vaping status: Never Used   Substance Use Topics    Alcohol use: No    Drug use: No       Medications Prior to Admission   Medication Sig Dispense Refill Last Dose/Taking    tamsulosin (FLOMAX) 0.4 MG capsule 24 hr capsule Take 1 capsule by mouth As Needed (kidney stones). 30 capsule 0 Unknown       Allergies:  Azithromycin, Pork-derived products, and Shellfish-derived products      Vital Signs         Review of Systems    The following systems were reviewed and negative;  ENT, respiratory, cardiovascular, gastrointestinal, genitourinary, breast, endocrine and allergies / immunologic.      Physical Exam:      General Appearance:    Alert, cooperative, in no acute distress   Head:    Normocephalic, without obvious abnormality, atraumatic   Eyes:            Lids and lashes normal, conjunctivae and sclerae normal, no   icterus, no pallor, corneas clear, PERRLA   Ears:    Ears appear intact with no abnormalities noted   Throat:   No oral  lesions, no thrush, oral mucosa moist   Neck:   No adenopathy, supple, trachea midline, no thyromegaly, no     carotid bruit, no JVD   Back:     No kyphosis present, no scoliosis present, no skin lesions,       erythema or scars, no tenderness to percussion or                   palpation,   range of motion normal   Lungs:     Clear to auscultation,respirations regular, even and                   unlabored    Heart:    Regular rhythm and normal rate, normal S1 and S2, no            murmur, no gallop, no rub, no click   Breast Exam:    Deferred   Abdomen:     Normal bowel sounds, no masses, no organomegaly, soft        non-tender, non-distended, no guarding, no rebound                 tenderness   Genitalia:    Deferred   Extremities:   Moves all extremities well, no edema, no cyanosis, no              redness   Pulses:   Pulses palpable and equal bilaterally   Skin:   No bleeding, bruising or rash   Lymph nodes:   No palpable adenopathy   Neurologic:   Cranial nerves 2 - 12 grossly intact, sensation intact, DTR        present and equal bilaterally         Assessment: Patient is a 29 y.o. female who presents with missed .    Plan of Care: Proceed with suction dilation and curettage.      Christin Peralta DO  25  09:05 EST

## 2025-02-03 NOTE — ANESTHESIA PROCEDURE NOTES
Airway  Urgency: elective    Date/Time: 2/3/2025 9:48 AM    General Information and Staff    Patient location during procedure: OR    Indications and Patient Condition    Preoxygenated: yes  Mask difficulty assessment: 0 - not attempted    Final Airway Details  Final airway type: supraglottic airway      Successful airway: LMA  Size 4     Number of attempts at approach: 1  Assessment: lips, teeth, and gum same as pre-op

## 2025-02-19 LAB — REF LAB TEST METHOD: NORMAL

## 2025-08-15 ENCOUNTER — TRANSCRIBE ORDERS (OUTPATIENT)
Dept: ADMINISTRATIVE | Facility: HOSPITAL | Age: 30
End: 2025-08-15
Payer: COMMERCIAL

## 2025-08-15 DIAGNOSIS — Q61.5 MEDULLARY SPONGE KIDNEY: Primary | ICD-10-CM

## (undated) DEVICE — ST COL BERKELY TBG

## (undated) DEVICE — SYS FLUID MGMT HYST SAFETOUCH

## (undated) DEVICE — VACURETTE CRV RIGD 8MM DISP

## (undated) DEVICE — COR MINOR LITHOTOMY: Brand: MEDLINE INDUSTRIES, INC.

## (undated) DEVICE — HOSE BT TO BT VAC CURETTAGE SNGL PT USE EA/10

## (undated) DEVICE — GLV SURG TRIUMPH MICRO PF LTX 6.5 STRL

## (undated) DEVICE — TBG W FLTR FOR BERKELEY SYSTEM